# Patient Record
Sex: MALE | Race: WHITE | Employment: FULL TIME | URBAN - NONMETROPOLITAN AREA
[De-identification: names, ages, dates, MRNs, and addresses within clinical notes are randomized per-mention and may not be internally consistent; named-entity substitution may affect disease eponyms.]

---

## 2017-03-01 ENCOUNTER — OFFICE VISIT (OUTPATIENT)
Dept: FAMILY MEDICINE CLINIC | Age: 37
End: 2017-03-01

## 2017-03-01 VITALS
HEIGHT: 74 IN | RESPIRATION RATE: 12 BRPM | BODY MASS INDEX: 27.75 KG/M2 | SYSTOLIC BLOOD PRESSURE: 114 MMHG | DIASTOLIC BLOOD PRESSURE: 80 MMHG | WEIGHT: 216.2 LBS | HEART RATE: 60 BPM

## 2017-03-01 DIAGNOSIS — I34.1 MILD MITRAL VALVE PROLAPSE: ICD-10-CM

## 2017-03-01 DIAGNOSIS — Z00.00 ENCOUNTER FOR PREVENTATIVE ADULT HEALTH CARE EXAMINATION: Primary | ICD-10-CM

## 2017-03-01 DIAGNOSIS — R55 VASODEPRESSOR SYNCOPE: ICD-10-CM

## 2017-03-01 PROCEDURE — 99395 PREV VISIT EST AGE 18-39: CPT | Performed by: FAMILY MEDICINE

## 2017-03-01 ASSESSMENT — ENCOUNTER SYMPTOMS
GASTROINTESTINAL NEGATIVE: 1
ALLERGIC/IMMUNOLOGIC NEGATIVE: 1
RESPIRATORY NEGATIVE: 1
EYES NEGATIVE: 1

## 2017-03-13 RX ORDER — CITALOPRAM 10 MG/1
10 TABLET ORAL DAILY
Qty: 30 TABLET | Refills: 11 | Status: SHIPPED | OUTPATIENT
Start: 2017-03-13 | End: 2018-03-21 | Stop reason: SDUPTHER

## 2017-03-23 ENCOUNTER — OFFICE VISIT (OUTPATIENT)
Dept: CARDIOLOGY | Age: 37
End: 2017-03-23
Payer: COMMERCIAL

## 2017-03-23 VITALS
BODY MASS INDEX: 28.11 KG/M2 | DIASTOLIC BLOOD PRESSURE: 70 MMHG | HEART RATE: 59 BPM | SYSTOLIC BLOOD PRESSURE: 114 MMHG | HEIGHT: 74 IN | WEIGHT: 219 LBS

## 2017-03-23 DIAGNOSIS — I34.1 MVP (MITRAL VALVE PROLAPSE): Primary | ICD-10-CM

## 2017-03-23 DIAGNOSIS — R55 VASODEPRESSOR SYNCOPE: ICD-10-CM

## 2017-03-23 PROCEDURE — 99213 OFFICE O/P EST LOW 20 MIN: CPT | Performed by: INTERNAL MEDICINE

## 2017-03-23 PROCEDURE — 93000 ELECTROCARDIOGRAM COMPLETE: CPT | Performed by: INTERNAL MEDICINE

## 2017-03-23 ASSESSMENT — ENCOUNTER SYMPTOMS
CHOKING: 0
ABDOMINAL DISTENTION: 0
WHEEZING: 0
COUGH: 0
SHORTNESS OF BREATH: 0
CHEST TIGHTNESS: 0

## 2017-03-27 ENCOUNTER — TELEPHONE (OUTPATIENT)
Dept: FAMILY MEDICINE CLINIC | Age: 37
End: 2017-03-27

## 2017-03-27 DIAGNOSIS — E78.5 HYPERLIPIDEMIA, UNSPECIFIED HYPERLIPIDEMIA TYPE: Primary | ICD-10-CM

## 2017-03-27 RX ORDER — ATORVASTATIN CALCIUM 10 MG/1
10 TABLET, FILM COATED ORAL DAILY
Qty: 30 TABLET | Refills: 3 | Status: SHIPPED | OUTPATIENT
Start: 2017-03-27 | End: 2017-08-04 | Stop reason: SDUPTHER

## 2017-08-04 RX ORDER — ATORVASTATIN CALCIUM 10 MG/1
TABLET, FILM COATED ORAL
Qty: 30 TABLET | Refills: 6 | Status: SHIPPED | OUTPATIENT
Start: 2017-08-04 | End: 2018-03-21 | Stop reason: SDUPTHER

## 2017-08-07 ENCOUNTER — OFFICE VISIT (OUTPATIENT)
Dept: PRIMARY CARE CLINIC | Age: 37
End: 2017-08-07
Payer: COMMERCIAL

## 2017-08-07 VITALS
BODY MASS INDEX: 27.46 KG/M2 | SYSTOLIC BLOOD PRESSURE: 120 MMHG | OXYGEN SATURATION: 98 % | TEMPERATURE: 98.9 F | HEIGHT: 74 IN | HEART RATE: 72 BPM | WEIGHT: 214 LBS | DIASTOLIC BLOOD PRESSURE: 80 MMHG

## 2017-08-07 DIAGNOSIS — J40 BRONCHITIS: Primary | ICD-10-CM

## 2017-08-07 PROCEDURE — 99213 OFFICE O/P EST LOW 20 MIN: CPT | Performed by: PHYSICIAN ASSISTANT

## 2017-08-07 RX ORDER — PREDNISONE 20 MG/1
20 TABLET ORAL 2 TIMES DAILY
Qty: 10 TABLET | Refills: 0 | Status: SHIPPED | OUTPATIENT
Start: 2017-08-07 | End: 2017-08-12

## 2017-08-07 RX ORDER — DEXTROMETHORPHAN HYDROBROMIDE AND PROMETHAZINE HYDROCHLORIDE 15; 6.25 MG/5ML; MG/5ML
5 SYRUP ORAL 4 TIMES DAILY PRN
Qty: 120 ML | Refills: 0 | Status: SHIPPED | OUTPATIENT
Start: 2017-08-07 | End: 2017-08-14

## 2017-08-07 RX ORDER — AZITHROMYCIN 250 MG/1
TABLET, FILM COATED ORAL
Qty: 1 PACKET | Refills: 0 | Status: SHIPPED | OUTPATIENT
Start: 2017-08-07 | End: 2017-08-17

## 2017-08-07 ASSESSMENT — ENCOUNTER SYMPTOMS
SHORTNESS OF BREATH: 0
WHEEZING: 0
SORE THROAT: 1
COUGH: 1

## 2018-03-08 ENCOUNTER — OFFICE VISIT (OUTPATIENT)
Dept: FAMILY MEDICINE CLINIC | Age: 38
End: 2018-03-08
Payer: COMMERCIAL

## 2018-03-08 VITALS
SYSTOLIC BLOOD PRESSURE: 118 MMHG | HEIGHT: 74 IN | DIASTOLIC BLOOD PRESSURE: 68 MMHG | BODY MASS INDEX: 28 KG/M2 | WEIGHT: 218.2 LBS | HEART RATE: 63 BPM | RESPIRATION RATE: 18 BRPM

## 2018-03-08 DIAGNOSIS — R55 VASODEPRESSOR SYNCOPE: ICD-10-CM

## 2018-03-08 DIAGNOSIS — E78.5 HYPERLIPIDEMIA, UNSPECIFIED HYPERLIPIDEMIA TYPE: ICD-10-CM

## 2018-03-08 DIAGNOSIS — Z11.4 SCREENING FOR HIV (HUMAN IMMUNODEFICIENCY VIRUS): Primary | ICD-10-CM

## 2018-03-08 DIAGNOSIS — Z00.00 ENCOUNTER FOR PREVENTATIVE ADULT HEALTH CARE EXAMINATION: ICD-10-CM

## 2018-03-08 PROCEDURE — 99395 PREV VISIT EST AGE 18-39: CPT | Performed by: FAMILY MEDICINE

## 2018-03-08 ASSESSMENT — PATIENT HEALTH QUESTIONNAIRE - PHQ9
2. FEELING DOWN, DEPRESSED OR HOPELESS: 0
SUM OF ALL RESPONSES TO PHQ9 QUESTIONS 1 & 2: 0
SUM OF ALL RESPONSES TO PHQ QUESTIONS 1-9: 0
1. LITTLE INTEREST OR PLEASURE IN DOING THINGS: 0

## 2018-03-08 ASSESSMENT — ENCOUNTER SYMPTOMS
ALLERGIC/IMMUNOLOGIC NEGATIVE: 1
EYES NEGATIVE: 1
GASTROINTESTINAL NEGATIVE: 1
RESPIRATORY NEGATIVE: 1

## 2018-03-08 NOTE — PROGRESS NOTES
Visit Information    Have you changed or started any medications since your last visit including any over-the-counter medicines, vitamins, or herbal medicines? no   Are you having any side effects from any of your medications? -  no  Have you stopped taking any of your medications? Is so, why? -  no    Have you seen any other physician or provider since your last visit? No  Have you had any other diagnostic tests since your last visit? No  Have you been seen in the emergency room and/or had an admission to a hospital since we last saw you? No  Have you had your routine dental cleaning in the past 6 months? no    Have you activated your [employer] account? If not, what are your barriers?  No     Patient Care Team:  Florina Al MD as PCP - General (Family Medicine)    Medical History Review  Past Medical, Family, and Social History reviewed and does contribute to the patient presenting condition    Health Maintenance   Topic Date Due    HIV screen  11/21/1995    Flu vaccine (1) 09/01/2017    DTaP/Tdap/Td vaccine (8 - Td) 01/12/2020
heel pain ongoing. Skin: Negative. Allergic/Immunologic: Negative. Neurological: Negative. Hematological: Negative. Psychiatric/Behavioral: Negative. Objective:   Physical Exam   Constitutional: He is oriented to person, place, and time. He appears well-developed and well-nourished. No distress. HENT:   Head: Normocephalic and atraumatic. Right Ear: External ear normal.   Left Ear: External ear normal.   Mouth/Throat: Oropharynx is clear and moist. No oropharyngeal exudate. Eyes: Conjunctivae and EOM are normal. No scleral icterus. Neck: Neck supple. No thyromegaly present. Cardiovascular: Normal rate, regular rhythm, normal heart sounds and intact distal pulses. No murmur heard. Pulmonary/Chest: Effort normal and breath sounds normal. No respiratory distress. He has no wheezes. Abdominal: Soft. Bowel sounds are normal. He exhibits no distension. There is no tenderness. There is no rebound. Musculoskeletal: Normal range of motion. He exhibits no edema or tenderness. Neurological: He is alert and oriented to person, place, and time. Skin: Skin is warm and dry. No rash noted. No erythema. Psychiatric: He has a normal mood and affect. His behavior is normal. Judgment and thought content normal.     /68   Pulse 63   Resp 18   Ht 6' 2.02\" (1.88 m)   Wt 218 lb 3.2 oz (99 kg)   BMI 28.00 kg/m²     Assessment:       Annual Updated health maintenance annual exam  Healthy male for age  Intermittent plantar fasciitis in heels. History of MVP. Quiescent long term. History of vasomotor syncope. Remote, no recurrent symptoms on citalopram.  Cont. Same. Hyperlipidemia; updated labs pending draw on lipitor. Plan:          Hyperlipidemia; follow up labs pending. Plan to cont. Current dosing and 6 month follow up. Syncope; quiescent over the last year. Plan to cont. celexa for now. Plantar fasciitis.   Discussed heel cup trial.  Can inject if persistent

## 2018-03-12 ENCOUNTER — HOSPITAL ENCOUNTER (OUTPATIENT)
Dept: LAB | Age: 38
Setting detail: SPECIMEN
Discharge: HOME | End: 2018-03-12
Payer: COMMERCIAL

## 2018-03-12 DIAGNOSIS — Z11.4 SCREENING FOR HIV (HUMAN IMMUNODEFICIENCY VIRUS): ICD-10-CM

## 2018-03-12 DIAGNOSIS — E78.5 HYPERLIPIDEMIA, UNSPECIFIED HYPERLIPIDEMIA TYPE: ICD-10-CM

## 2018-03-12 LAB
ALT SERPL-CCNC: 37 U/L (ref 5–41)
CHOLESTEROL/HDL RATIO: 4.5
CHOLESTEROL: 168 MG/DL
HDLC SERPL-MCNC: 37 MG/DL
HIV AG/AB: NONREACTIVE
LDL CHOLESTEROL: 98 MG/DL (ref 0–130)
TRIGL SERPL-MCNC: 167 MG/DL
VLDLC SERPL CALC-MCNC: ABNORMAL MG/DL (ref 1–30)

## 2018-03-12 PROCEDURE — 80061 LIPID PANEL: CPT

## 2018-03-12 PROCEDURE — 36415 COLL VENOUS BLD VENIPUNCTURE: CPT

## 2018-03-12 PROCEDURE — 84460 ALANINE AMINO (ALT) (SGPT): CPT

## 2018-03-12 PROCEDURE — 87389 HIV-1 AG W/HIV-1&-2 AB AG IA: CPT

## 2018-03-15 ENCOUNTER — OFFICE VISIT (OUTPATIENT)
Dept: CARDIOLOGY | Age: 38
End: 2018-03-15
Payer: COMMERCIAL

## 2018-03-15 VITALS
BODY MASS INDEX: 27.59 KG/M2 | HEART RATE: 58 BPM | HEIGHT: 74 IN | SYSTOLIC BLOOD PRESSURE: 120 MMHG | DIASTOLIC BLOOD PRESSURE: 70 MMHG | WEIGHT: 215 LBS

## 2018-03-15 DIAGNOSIS — I34.1 MVP (MITRAL VALVE PROLAPSE): Primary | ICD-10-CM

## 2018-03-15 PROCEDURE — 93000 ELECTROCARDIOGRAM COMPLETE: CPT | Performed by: INTERNAL MEDICINE

## 2018-03-15 PROCEDURE — 99212 OFFICE O/P EST SF 10 MIN: CPT | Performed by: INTERNAL MEDICINE

## 2018-03-15 NOTE — PROGRESS NOTES
No  Skin: Warm and dry    Cardiac data:    EKG: Sinus  Bradycardia   WITHIN NORMAL LIMITS    Labs:     CBC: No results for input(s): WBC, HGB, HCT, PLT in the last 72 hours. BMP: No results for input(s): NA, K, CO2, BUN, CREATININE, LABGLOM, GLUCOSE in the last 72 hours. PT/INR: No results for input(s): PROTIME, INR in the last 72 hours.   FASTING LIPID PANEL:  Lab Results   Component Value Date    HDL 37 03/12/2018    TRIG 167 03/12/2018     LIVER PROFILE:  Recent Labs      03/12/18   1057   ALT  37       IMPRESSION:    Vasodepressor syncope, +tilt table test, asymptomatic on SSRI  Asymptomatic Bradycardia   HTN, contorlled  Patient Active Problem List   Diagnosis    Vasodepressor syncope    MVP (mitral valve prolapse)    Mild mitral valve prolapse       RECOMMENDATIONS:  REGULAR EXERCISE  CONTINUE CURRENT TREATMENT    F/U 12  MONTHS          [facility]           [phone]

## 2018-03-22 RX ORDER — ATORVASTATIN CALCIUM 10 MG/1
TABLET, FILM COATED ORAL
Qty: 90 TABLET | Refills: 1 | Status: SHIPPED | OUTPATIENT
Start: 2018-03-22 | End: 2018-09-10 | Stop reason: SDUPTHER

## 2018-03-22 RX ORDER — CITALOPRAM 10 MG/1
TABLET ORAL
Qty: 90 TABLET | Refills: 1 | Status: SHIPPED | OUTPATIENT
Start: 2018-03-22 | End: 2018-09-10 | Stop reason: SDUPTHER

## 2018-09-10 ENCOUNTER — OFFICE VISIT (OUTPATIENT)
Dept: FAMILY MEDICINE CLINIC | Age: 38
End: 2018-09-10
Payer: COMMERCIAL

## 2018-09-10 VITALS
RESPIRATION RATE: 12 BRPM | DIASTOLIC BLOOD PRESSURE: 76 MMHG | BODY MASS INDEX: 28.75 KG/M2 | HEIGHT: 74 IN | HEART RATE: 56 BPM | WEIGHT: 224 LBS | SYSTOLIC BLOOD PRESSURE: 112 MMHG

## 2018-09-10 DIAGNOSIS — E78.00 PURE HYPERCHOLESTEROLEMIA: ICD-10-CM

## 2018-09-10 DIAGNOSIS — I34.1 MILD MITRAL VALVE PROLAPSE: ICD-10-CM

## 2018-09-10 DIAGNOSIS — R55 VASODEPRESSOR SYNCOPE: ICD-10-CM

## 2018-09-10 DIAGNOSIS — Z00.00 ENCOUNTER FOR PREVENTATIVE ADULT HEALTH CARE EXAMINATION: Primary | ICD-10-CM

## 2018-09-10 PROCEDURE — 99395 PREV VISIT EST AGE 18-39: CPT | Performed by: FAMILY MEDICINE

## 2018-09-10 RX ORDER — ATORVASTATIN CALCIUM 10 MG/1
10 TABLET, FILM COATED ORAL DAILY
Qty: 90 TABLET | Refills: 3 | Status: SHIPPED | OUTPATIENT
Start: 2018-09-10 | End: 2019-09-28 | Stop reason: SDUPTHER

## 2018-09-10 RX ORDER — CITALOPRAM 10 MG/1
10 TABLET ORAL DAILY
Qty: 90 TABLET | Refills: 3 | Status: SHIPPED | OUTPATIENT
Start: 2018-09-10 | End: 2019-09-28 | Stop reason: SDUPTHER

## 2018-09-10 ASSESSMENT — PATIENT HEALTH QUESTIONNAIRE - PHQ9
1. LITTLE INTEREST OR PLEASURE IN DOING THINGS: 0
SUM OF ALL RESPONSES TO PHQ QUESTIONS 1-9: 0
SUM OF ALL RESPONSES TO PHQ QUESTIONS 1-9: 0
SUM OF ALL RESPONSES TO PHQ9 QUESTIONS 1 & 2: 0
2. FEELING DOWN, DEPRESSED OR HOPELESS: 0

## 2018-09-10 ASSESSMENT — ENCOUNTER SYMPTOMS
RESPIRATORY NEGATIVE: 1
ALLERGIC/IMMUNOLOGIC NEGATIVE: 1
EYES NEGATIVE: 1
GASTROINTESTINAL NEGATIVE: 1

## 2018-09-10 NOTE — PROGRESS NOTES
celexa for now. Plantar fasciitis. Discussed heel cup trial.  Can inject if persistent or progressive. Ongoing but improved at present. Will review labs as they become available.

## 2018-09-10 NOTE — PATIENT INSTRUCTIONS
High cholesterol raises your risk of a heart attack and stroke. There are different types of cholesterol. LDL is the \"bad\" cholesterol. High LDL can raise your risk for heart disease, heart attack, and stroke. HDL is the \"good\" cholesterol. High HDL is linked with a lower risk for heart disease, heart attack, and stroke. Your cholesterol levels help your doctor find out your risk for having a heart attack or stroke. How can you prevent high cholesterol? A heart-healthy lifestyle can help you prevent high cholesterol. This lifestyle helps lower your risk for a heart attack and stroke. · Eat heart-healthy foods. ¨ Eat fruits, vegetables, whole grains (like oatmeal), dried beans and peas, nuts and seeds, soy products (like tofu), and fat-free or low-fat dairy products. ¨ Replace butter, margarine, and hydrogenated or partially hydrogenated oils with olive and canola oils. (Canola oil margarine without trans fat is fine.)  ¨ Replace red meat with fish, poultry, and soy protein (like tofu). ¨ Limit processed and packaged foods like chips, crackers, and cookies. · Be active. Exercise can improve your cholesterol level. Get at least 30 minutes of exercise on most days of the week. Walking is a good choice. You also may want to do other activities, such as running, swimming, cycling, or playing tennis or team sports. · Stay at a healthy weight. Lose weight if you need to. · Don't smoke. If you need help quitting, talk to your doctor about stop-smoking programs and medicines. These can increase your chances of quitting for good. How is high cholesterol treated? The goal of treatment is to reduce your chances of having a heart attack or stroke. The goal is not to lower your cholesterol numbers only. · You may make lifestyle changes, such as eating healthy foods, not smoking, losing weight, and being more active. · You may have to take medicine. Follow-up care is a key part of your treatment and safety.  Be sure

## 2019-03-01 ENCOUNTER — HOSPITAL ENCOUNTER (OUTPATIENT)
Dept: LAB | Age: 39
Discharge: HOME | End: 2019-03-01
Payer: COMMERCIAL

## 2019-03-01 DIAGNOSIS — Z00.00 ENCOUNTER FOR PREVENTATIVE ADULT HEALTH CARE EXAMINATION: ICD-10-CM

## 2019-03-01 DIAGNOSIS — E78.00 PURE HYPERCHOLESTEROLEMIA: ICD-10-CM

## 2019-03-01 LAB
ALBUMIN SERPL-MCNC: 5 G/DL (ref 3.5–5.2)
ALBUMIN/GLOBULIN RATIO: 1.9 (ref 1–2.5)
ALP BLD-CCNC: 66 U/L (ref 40–129)
ALT SERPL-CCNC: 32 U/L (ref 5–41)
ANION GAP SERPL CALCULATED.3IONS-SCNC: 13 MMOL/L (ref 9–17)
AST SERPL-CCNC: 28 U/L
BILIRUB SERPL-MCNC: 0.87 MG/DL (ref 0.3–1.2)
BUN BLDV-MCNC: 11 MG/DL (ref 6–20)
BUN/CREAT BLD: 13 (ref 9–20)
CALCIUM SERPL-MCNC: 10.2 MG/DL (ref 8.6–10.4)
CHLORIDE BLD-SCNC: 101 MMOL/L (ref 98–107)
CHOLESTEROL/HDL RATIO: 4.6
CHOLESTEROL: 156 MG/DL
CO2: 28 MMOL/L (ref 20–31)
CREAT SERPL-MCNC: 0.82 MG/DL (ref 0.7–1.2)
GFR AFRICAN AMERICAN: >60 ML/MIN
GFR NON-AFRICAN AMERICAN: >60 ML/MIN
GFR SERPL CREATININE-BSD FRML MDRD: ABNORMAL ML/MIN/{1.73_M2}
GFR SERPL CREATININE-BSD FRML MDRD: ABNORMAL ML/MIN/{1.73_M2}
GLUCOSE BLD-MCNC: 104 MG/DL (ref 70–99)
HDLC SERPL-MCNC: 34 MG/DL
LDL CHOLESTEROL: 90 MG/DL (ref 0–130)
POTASSIUM SERPL-SCNC: 4.1 MMOL/L (ref 3.7–5.3)
SODIUM BLD-SCNC: 142 MMOL/L (ref 135–144)
TOTAL CK: 208 U/L (ref 39–308)
TOTAL PROTEIN: 7.6 G/DL (ref 6.4–8.3)
TRIGL SERPL-MCNC: 160 MG/DL
VLDLC SERPL CALC-MCNC: ABNORMAL MG/DL (ref 1–30)

## 2019-03-01 PROCEDURE — 80053 COMPREHEN METABOLIC PANEL: CPT

## 2019-03-01 PROCEDURE — 80061 LIPID PANEL: CPT

## 2019-03-01 PROCEDURE — 36415 COLL VENOUS BLD VENIPUNCTURE: CPT

## 2019-03-01 PROCEDURE — 82550 ASSAY OF CK (CPK): CPT

## 2019-03-14 ENCOUNTER — OFFICE VISIT (OUTPATIENT)
Dept: CARDIOLOGY | Age: 39
End: 2019-03-14
Payer: COMMERCIAL

## 2019-03-14 VITALS
HEIGHT: 74 IN | SYSTOLIC BLOOD PRESSURE: 110 MMHG | BODY MASS INDEX: 29.34 KG/M2 | HEART RATE: 58 BPM | WEIGHT: 228.6 LBS | DIASTOLIC BLOOD PRESSURE: 70 MMHG

## 2019-03-14 DIAGNOSIS — I34.1 MVP (MITRAL VALVE PROLAPSE): Primary | ICD-10-CM

## 2019-03-14 DIAGNOSIS — R55 VASODEPRESSOR SYNCOPE: ICD-10-CM

## 2019-03-14 PROCEDURE — 99212 OFFICE O/P EST SF 10 MIN: CPT | Performed by: INTERNAL MEDICINE

## 2019-03-14 PROCEDURE — 93000 ELECTROCARDIOGRAM COMPLETE: CPT | Performed by: INTERNAL MEDICINE

## 2019-03-15 ENCOUNTER — OFFICE VISIT (OUTPATIENT)
Dept: FAMILY MEDICINE CLINIC | Age: 39
End: 2019-03-15
Payer: COMMERCIAL

## 2019-03-15 VITALS
WEIGHT: 227 LBS | SYSTOLIC BLOOD PRESSURE: 110 MMHG | BODY MASS INDEX: 29.13 KG/M2 | HEART RATE: 68 BPM | HEIGHT: 74 IN | DIASTOLIC BLOOD PRESSURE: 60 MMHG

## 2019-03-15 DIAGNOSIS — M72.2 PLANTAR FASCIITIS, BILATERAL: ICD-10-CM

## 2019-03-15 DIAGNOSIS — R55 VASODEPRESSOR SYNCOPE: ICD-10-CM

## 2019-03-15 DIAGNOSIS — E78.00 PURE HYPERCHOLESTEROLEMIA: ICD-10-CM

## 2019-03-15 PROCEDURE — 99214 OFFICE O/P EST MOD 30 MIN: CPT | Performed by: FAMILY MEDICINE

## 2019-03-15 ASSESSMENT — ENCOUNTER SYMPTOMS
RESPIRATORY NEGATIVE: 1
EYES NEGATIVE: 1
ALLERGIC/IMMUNOLOGIC NEGATIVE: 1
GASTROINTESTINAL NEGATIVE: 1

## 2019-03-15 ASSESSMENT — PATIENT HEALTH QUESTIONNAIRE - PHQ9
SUM OF ALL RESPONSES TO PHQ9 QUESTIONS 1 & 2: 0
SUM OF ALL RESPONSES TO PHQ QUESTIONS 1-9: 0
SUM OF ALL RESPONSES TO PHQ QUESTIONS 1-9: 0
2. FEELING DOWN, DEPRESSED OR HOPELESS: 0
1. LITTLE INTEREST OR PLEASURE IN DOING THINGS: 0

## 2019-09-30 RX ORDER — ATORVASTATIN CALCIUM 10 MG/1
TABLET, FILM COATED ORAL
Qty: 30 TABLET | Refills: 11 | Status: SHIPPED | OUTPATIENT
Start: 2019-09-30 | End: 2020-09-15 | Stop reason: SDUPTHER

## 2019-09-30 RX ORDER — CITALOPRAM 10 MG/1
TABLET ORAL
Qty: 30 TABLET | Refills: 11 | Status: SHIPPED | OUTPATIENT
Start: 2019-09-30 | End: 2020-09-15 | Stop reason: SDUPTHER

## 2020-03-16 ENCOUNTER — OFFICE VISIT (OUTPATIENT)
Dept: FAMILY MEDICINE CLINIC | Age: 40
End: 2020-03-16
Payer: COMMERCIAL

## 2020-03-16 VITALS
SYSTOLIC BLOOD PRESSURE: 130 MMHG | HEART RATE: 72 BPM | WEIGHT: 226.3 LBS | BODY MASS INDEX: 29.04 KG/M2 | RESPIRATION RATE: 16 BRPM | OXYGEN SATURATION: 98 % | DIASTOLIC BLOOD PRESSURE: 82 MMHG | HEIGHT: 74 IN | TEMPERATURE: 98.1 F

## 2020-03-16 PROCEDURE — 99214 OFFICE O/P EST MOD 30 MIN: CPT | Performed by: FAMILY MEDICINE

## 2020-03-16 ASSESSMENT — ENCOUNTER SYMPTOMS
GASTROINTESTINAL NEGATIVE: 1
RESPIRATORY NEGATIVE: 1
EYES NEGATIVE: 1
ALLERGIC/IMMUNOLOGIC NEGATIVE: 1

## 2020-03-16 ASSESSMENT — PATIENT HEALTH QUESTIONNAIRE - PHQ9
DEPRESSION UNABLE TO ASSESS: PT REFUSES
SUM OF ALL RESPONSES TO PHQ QUESTIONS 1-9: 0
SUM OF ALL RESPONSES TO PHQ9 QUESTIONS 1 & 2: 0
2. FEELING DOWN, DEPRESSED OR HOPELESS: 0
1. LITTLE INTEREST OR PLEASURE IN DOING THINGS: 0
SUM OF ALL RESPONSES TO PHQ QUESTIONS 1-9: 0

## 2020-03-16 NOTE — PROGRESS NOTES
Subjective:      Patient ID: Rick Chun is a 44 y.o. male. Hyperlipidemia        Routine annual preventative exam follow up on chronic medical conditions, refills, and review of updated labs. I have reviewed the patient's medical history in detail and updated the computerized patient record. Feeling well at present. No interval or illness to report. 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 Heel pain still off and on but much better then in the past, manageable. He does restrict some activities like running that aggravate the issue. Tolerating statin. Compliant with meds without concerns for side effects. Past Medical History:   Diagnosis Date    Erectile dysfunction     Hyperlipidemia     Mild mitral valve prolapse     with mild regurgitation, asymptomatic.  Pectus excavatum     Prediabetes     Vasodepressor syncope     since 1999. History reviewed. No pertinent surgical history. Current Outpatient Medications   Medication Sig Dispense Refill    atorvastatin (LIPITOR) 10 MG tablet TAKE 1 TABLET BY MOUTH ONCE DAILY 30 tablet 11    citalopram (CELEXA) 10 MG tablet TAKE 1 TABLET BY MOUTH ONCE DAILY 30 tablet 11     No current facility-administered medications for this visit. No Known Allergies  Social History     Tobacco Use    Smoking status: Never Smoker    Smokeless tobacco: Never Used   Substance Use Topics    Alcohol use:  Yes     Alcohol/week: 0.0 standard drinks     Comment: occasional, less than 1 drink per day    Drug use: No     Family History   Problem Relation Age of Onset    Cancer Maternal Grandmother         bone    Colon Cancer Maternal Grandfather     Heart Attack Maternal Grandfather     Colon Cancer Paternal Grandmother     Heart Disease Paternal Grandfather     Heart Attack Father         2 or 3    Cancer Father         Waldenstrom's macroglobulinemia    Heart Disease Father         unknown type, 35s lipitor    Syncope; quiescent over the last year. Plan to cont. celexa for now. Plantar fasciitis. Discussed heel cup trial.  Can inject if persistent or progressive. Ongoing but improved at present. More activity related exacerbations at present. Tolerable. Minimal ifbs this check. Will follow trend. Stable /low at present.

## 2020-08-13 ENCOUNTER — OFFICE VISIT (OUTPATIENT)
Dept: CARDIOLOGY | Age: 40
End: 2020-08-13
Payer: COMMERCIAL

## 2020-08-13 VITALS
HEART RATE: 57 BPM | SYSTOLIC BLOOD PRESSURE: 114 MMHG | BODY MASS INDEX: 29.65 KG/M2 | HEIGHT: 74 IN | DIASTOLIC BLOOD PRESSURE: 80 MMHG | WEIGHT: 231 LBS

## 2020-08-13 PROCEDURE — 93000 ELECTROCARDIOGRAM COMPLETE: CPT | Performed by: INTERNAL MEDICINE

## 2020-08-13 PROCEDURE — 99214 OFFICE O/P EST MOD 30 MIN: CPT | Performed by: INTERNAL MEDICINE

## 2020-08-13 NOTE — PROGRESS NOTES
hematuria. · Musculoskeletal:  No gait disturbance, No weakness or joint complaints. · Integumentary: No rash or pruritis. · Neurological: No headache or diplopia. No tingling  · Psychiatric: No anxiety, or depression. · Endocrine: No temperature intolerance. · Hematologic/Lymphatic: No abnormal bruising or bleeding, blood clots or swollen lymph nodes. · Allergic/Immunologic: No nasal congestion or hives. PHYSICAL EXAM:      /80   Pulse 57   Ht 6' 2\" (1.88 m)   Wt 231 lb (104.8 kg)   BMI 29.66 kg/m²    Constitutional and General Appearance: alert, cooperative, no distress and appears stated age  [de-identified]: PERRL, no cervical lymphadenopathy. No masses palpable. Normal oral mucosa  Respiratory:  · Normal excursion and expansion without use of accessory muscles  · Resp Auscultation: Good respiratory effort. No for increased work of breathing. On auscultation: clear to auscultation bilaterally  Cardiovascular:  · Heart tones are crisp and normal. regular S1 and S2.  · Jugular venous pulsation Normal  · The carotid upstroke is normal in amplitude and contour without delay or bruit   Abdomen:   · soft  · Bowel sounds present  Extremities:  ·  No edema  Neurological:  · Alert and oriented. Cardiac Data:  EKG: sinus bradycardia    Labs:     CBC: No results for input(s): WBC, HGB, HCT, PLT in the last 72 hours. BMP: No results for input(s): NA, K, CO2, BUN, CREATININE, LABGLOM, GLUCOSE in the last 72 hours. PT/INR: No results for input(s): PROTIME, INR in the last 72 hours. FASTING LIPID PANEL:  Lab Results   Component Value Date    HDL 34 03/01/2019    TRIG 160 03/01/2019     LIVER PROFILE:No results for input(s): AST, ALT, LABALBU in the last 72 hours. Assessment and plan:    -Vasodepressor syncope, +tilt table test, asymptomatic on SSRI. Liberalize po fluid intake  -H/o asymptomatic bradycardia  -Overweight - encouraged diet, exercise, and discussed weight loss extensively.   -Hyperlipidemia- continue statin. LDL 90 on 3/01/2019  -RTC 12 months.     [facility]           [phone]

## 2020-09-04 ENCOUNTER — HOSPITAL ENCOUNTER (OUTPATIENT)
Dept: LAB | Age: 40
Discharge: HOME | End: 2020-09-04
Payer: COMMERCIAL

## 2020-09-04 LAB
ABSOLUTE EOS #: 0.07 K/UL (ref 0–0.44)
ABSOLUTE IMMATURE GRANULOCYTE: <0.03 K/UL (ref 0–0.3)
ABSOLUTE LYMPH #: 1.24 K/UL (ref 1.1–3.7)
ABSOLUTE MONO #: 0.37 K/UL (ref 0.1–1.2)
ALBUMIN SERPL-MCNC: 4.8 G/DL (ref 3.5–5.2)
ALBUMIN/GLOBULIN RATIO: 1.9 (ref 1–2.5)
ALP BLD-CCNC: 58 U/L (ref 40–129)
ALT SERPL-CCNC: 28 U/L (ref 5–41)
ANION GAP SERPL CALCULATED.3IONS-SCNC: 10 MMOL/L (ref 9–17)
AST SERPL-CCNC: 23 U/L
BASOPHILS # BLD: 0 % (ref 0–2)
BASOPHILS ABSOLUTE: <0.03 K/UL (ref 0–0.2)
BILIRUB SERPL-MCNC: 0.83 MG/DL (ref 0.3–1.2)
BUN BLDV-MCNC: 12 MG/DL (ref 6–20)
BUN/CREAT BLD: 13 (ref 9–20)
CALCIUM SERPL-MCNC: 10 MG/DL (ref 8.6–10.4)
CHLORIDE BLD-SCNC: 103 MMOL/L (ref 98–107)
CHOLESTEROL/HDL RATIO: 5.8
CHOLESTEROL: 169 MG/DL
CO2: 26 MMOL/L (ref 20–31)
CREAT SERPL-MCNC: 0.93 MG/DL (ref 0.7–1.2)
DIFFERENTIAL TYPE: NORMAL
EOSINOPHILS RELATIVE PERCENT: 1 % (ref 1–4)
ESTIMATED AVERAGE GLUCOSE: 103 MG/DL
GFR AFRICAN AMERICAN: >60 ML/MIN
GFR NON-AFRICAN AMERICAN: >60 ML/MIN
GFR SERPL CREATININE-BSD FRML MDRD: ABNORMAL ML/MIN/{1.73_M2}
GFR SERPL CREATININE-BSD FRML MDRD: ABNORMAL ML/MIN/{1.73_M2}
GLUCOSE BLD-MCNC: 108 MG/DL (ref 70–99)
HBA1C MFR BLD: 5.2 % (ref 4.8–5.9)
HCT VFR BLD CALC: 46.3 % (ref 40.7–50.3)
HDLC SERPL-MCNC: 29 MG/DL
HEMOGLOBIN: 15.4 G/DL (ref 13–17)
IMMATURE GRANULOCYTES: 0 %
LDL CHOLESTEROL: 103 MG/DL (ref 0–130)
LYMPHOCYTES # BLD: 26 % (ref 24–43)
MCH RBC QN AUTO: 31.1 PG (ref 25.2–33.5)
MCHC RBC AUTO-ENTMCNC: 33.3 G/DL (ref 25.2–33.5)
MCV RBC AUTO: 93.5 FL (ref 82.6–102.9)
MONOCYTES # BLD: 8 % (ref 3–12)
NRBC AUTOMATED: 0 PER 100 WBC
PDW BLD-RTO: 12.4 % (ref 11.8–14.4)
PLATELET # BLD: 182 K/UL (ref 138–453)
PLATELET ESTIMATE: NORMAL
PMV BLD AUTO: 9.4 FL (ref 8.1–13.5)
POTASSIUM SERPL-SCNC: 4 MMOL/L (ref 3.7–5.3)
RBC # BLD: 4.95 M/UL (ref 4.21–5.77)
RBC # BLD: NORMAL 10*6/UL
SEG NEUTROPHILS: 65 % (ref 36–65)
SEGMENTED NEUTROPHILS ABSOLUTE COUNT: 3.13 K/UL (ref 1.5–8.1)
SODIUM BLD-SCNC: 139 MMOL/L (ref 135–144)
TOTAL PROTEIN: 7.3 G/DL (ref 6.4–8.3)
TRIGL SERPL-MCNC: 184 MG/DL
VLDLC SERPL CALC-MCNC: ABNORMAL MG/DL (ref 1–30)
WBC # BLD: 4.8 K/UL (ref 3.5–11.3)
WBC # BLD: NORMAL 10*3/UL

## 2020-09-04 PROCEDURE — 83036 HEMOGLOBIN GLYCOSYLATED A1C: CPT

## 2020-09-04 PROCEDURE — 80053 COMPREHEN METABOLIC PANEL: CPT

## 2020-09-04 PROCEDURE — 80061 LIPID PANEL: CPT

## 2020-09-04 PROCEDURE — 85025 COMPLETE CBC W/AUTO DIFF WBC: CPT

## 2020-09-04 PROCEDURE — 36415 COLL VENOUS BLD VENIPUNCTURE: CPT

## 2020-09-15 ENCOUNTER — OFFICE VISIT (OUTPATIENT)
Dept: FAMILY MEDICINE CLINIC | Age: 40
End: 2020-09-15
Payer: COMMERCIAL

## 2020-09-15 VITALS
BODY MASS INDEX: 28.75 KG/M2 | WEIGHT: 224 LBS | TEMPERATURE: 97 F | HEIGHT: 74 IN | SYSTOLIC BLOOD PRESSURE: 108 MMHG | DIASTOLIC BLOOD PRESSURE: 64 MMHG | HEART RATE: 64 BPM

## 2020-09-15 PROCEDURE — 99213 OFFICE O/P EST LOW 20 MIN: CPT | Performed by: FAMILY MEDICINE

## 2020-09-15 RX ORDER — ATORVASTATIN CALCIUM 10 MG/1
TABLET, FILM COATED ORAL
Qty: 90 TABLET | Refills: 3 | Status: SHIPPED | OUTPATIENT
Start: 2020-09-15 | End: 2021-09-28 | Stop reason: SDUPTHER

## 2020-09-15 RX ORDER — CITALOPRAM 10 MG/1
TABLET ORAL
Qty: 90 TABLET | Refills: 3 | Status: SHIPPED | OUTPATIENT
Start: 2020-09-15 | End: 2021-09-28 | Stop reason: SDUPTHER

## 2020-09-15 ASSESSMENT — PATIENT HEALTH QUESTIONNAIRE - PHQ9
2. FEELING DOWN, DEPRESSED OR HOPELESS: 0
SUM OF ALL RESPONSES TO PHQ9 QUESTIONS 1 & 2: 0
SUM OF ALL RESPONSES TO PHQ QUESTIONS 1-9: 0
1. LITTLE INTEREST OR PLEASURE IN DOING THINGS: 0
SUM OF ALL RESPONSES TO PHQ QUESTIONS 1-9: 0

## 2020-09-15 ASSESSMENT — ENCOUNTER SYMPTOMS
EYES NEGATIVE: 1
RESPIRATORY NEGATIVE: 1
ALLERGIC/IMMUNOLOGIC NEGATIVE: 1
GASTROINTESTINAL NEGATIVE: 1

## 2020-09-15 NOTE — PATIENT INSTRUCTIONS
Chloride 09/04/2020 103  98 - 107 mmol/L Final    CO2 09/04/2020 26  20 - 31 mmol/L Final    Anion Gap 09/04/2020 10  9 - 17 mmol/L Final    Alkaline Phosphatase 09/04/2020 58  40 - 129 U/L Final    ALT 09/04/2020 28  5 - 41 U/L Final    AST 09/04/2020 23  <40 U/L Final    Total Bilirubin 09/04/2020 0.83  0.3 - 1.2 mg/dL Final    Total Protein 09/04/2020 7.3  6.4 - 8.3 g/dL Final    Alb 09/04/2020 4.8  3.5 - 5.2 g/dL Final    Albumin/Globulin Ratio 09/04/2020 1.9  1.0 - 2.5 Final    GFR Non- 09/04/2020 >60  >60 mL/min Final    GFR  09/04/2020 >60  >60 mL/min Final    GFR Comment 09/04/2020        Final    Comment: Average GFR for 30-36 years old:   80 mL/min/1.73sq m  Chronic Kidney Disease:   <60 mL/min/1.73sq m  Kidney failure:   <15 mL/min/1.73sq m              eGFR calculated using average adult body mass. Additional eGFR calculator available at:        [email or web address]            GFR Staging 09/04/2020 NOT REPORTED   Final    Cholesterol 09/04/2020 169  <200 mg/dL Final    Comment:    Cholesterol Guidelines:      <200  Desirable   200-240  Borderline      >240  Undesirable         HDL 09/04/2020 29* >40 mg/dL Final    Comment:    HDL Guidelines:    <40     Undesirable   40-59    Borderline    >59     Desirable         LDL Cholesterol 09/04/2020 103  0 - 130 mg/dL Final    Comment:    LDL Guidelines:     <100    Desirable   100-129   Near to/above Desirable   130-159   Borderline      >159   Undesirable     Direct (measured) LDL and calculated LDL are not interchangeable tests.  Chol/HDL Ratio 09/04/2020 5.8* <5 Final            Triglycerides 09/04/2020 184* <150 mg/dL Final    Comment:    Triglyceride Guidelines:     <150   Desirable   150-199  Borderline   200-499  High     >499   Very high   Based on AHA Guidelines for fasting triglyceride, October 2012.          VLDL 09/04/2020 NOT REPORTED* 1 - 30 mg/dL Final

## 2020-09-15 NOTE — PROGRESS NOTES
Subjective:      Patient ID: Harry Milner is a 44 y.o. male. Hyperlipidemia        Routine  follow up on chronic medical conditions, refills, and review of updated labs. I have reviewed the patient's medical history in detail and updated the computerized patient record. Feeling well at present. No interval or illness to report. 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 Heel pain still off and on but much better then in the past, manageable. He does restrict some activities like running that aggravate the issue. Tolerating statin. Compliant with meds without concerns for side effects. No dizziness or syncope concerns over the interval.    Past Medical History:   Diagnosis Date    Erectile dysfunction     Hyperlipidemia     Mild mitral valve prolapse     with mild regurgitation, asymptomatic.  Pectus excavatum     Prediabetes     Vasodepressor syncope     since 1999. History reviewed. No pertinent surgical history. Current Outpatient Medications   Medication Sig Dispense Refill    citalopram (CELEXA) 10 MG tablet TAKE 1 TABLET BY MOUTH ONCE DAILY 30 tablet 11    atorvastatin (LIPITOR) 10 MG tablet TAKE 1 TABLET BY MOUTH ONCE DAILY 30 tablet 11     No current facility-administered medications for this visit. No Known Allergies  Social History     Tobacco Use    Smoking status: Never Smoker    Smokeless tobacco: Never Used   Substance Use Topics    Alcohol use:  Yes     Alcohol/week: 0.0 standard drinks     Comment: occasional, less than 1 drink per day    Drug use: No     Family History   Problem Relation Age of Onset    Cancer Maternal Grandmother         bone    Colon Cancer Maternal Grandfather     Heart Attack Maternal Grandfather     Colon Cancer Paternal Grandmother     Heart Disease Paternal Grandfather     Heart Attack Father         2 or 3    Cancer Father         Waldenstrom's macroglobulinemia    Heart Disease Father         unknown type, 35s    Chronic Infections Maternal Cousin     Heart Disease Other     Hypertension Other          Review of Systems   Constitutional: Negative. HENT: Negative. Eyes: Negative. Respiratory: Negative. Cardiovascular: Negative. Gastrointestinal: Negative. Endocrine: Negative. Genitourinary: Negative. Musculoskeletal: Negative. Arthralgias: bilateral heel pain ongoing. Skin: Negative. Allergic/Immunologic: Negative. Neurological: Negative. Hematological: Negative. Psychiatric/Behavioral: Negative. Objective:   Physical Exam  Constitutional:       General: He is not in acute distress. Appearance: He is well-developed. HENT:      Head: Normocephalic and atraumatic. Right Ear: External ear normal.      Left Ear: External ear normal.      Mouth/Throat:      Pharynx: No oropharyngeal exudate. Eyes:      General: No scleral icterus. Conjunctiva/sclera: Conjunctivae normal.   Neck:      Musculoskeletal: Neck supple. Thyroid: No thyromegaly. Cardiovascular:      Rate and Rhythm: Normal rate and regular rhythm. Heart sounds: Normal heart sounds. No murmur. Pulmonary:      Effort: Pulmonary effort is normal. No respiratory distress. Breath sounds: Normal breath sounds. No wheezing. Abdominal:      General: Bowel sounds are normal. There is no distension. Palpations: Abdomen is soft. Tenderness: There is no abdominal tenderness. There is no rebound. Musculoskeletal: Normal range of motion. General: No tenderness. Skin:     General: Skin is warm and dry. Findings: No erythema or rash. Neurological:      Mental Status: He is alert and oriented to person, place, and time. Psychiatric:         Behavior: Behavior normal.         Thought Content:  Thought content normal.         Judgment: Judgment normal.       /64 (Site: Right Upper Arm, Position: Sitting, Cuff Size: Large Adult)  Sodium 09/04/2020 139  135 - 144 mmol/L Final    Potassium 09/04/2020 4.0  3.7 - 5.3 mmol/L Final    Chloride 09/04/2020 103  98 - 107 mmol/L Final    CO2 09/04/2020 26  20 - 31 mmol/L Final    Anion Gap 09/04/2020 10  9 - 17 mmol/L Final    Alkaline Phosphatase 09/04/2020 58  40 - 129 U/L Final    ALT 09/04/2020 28  5 - 41 U/L Final    AST 09/04/2020 23  <40 U/L Final    Total Bilirubin 09/04/2020 0.83  0.3 - 1.2 mg/dL Final    Total Protein 09/04/2020 7.3  6.4 - 8.3 g/dL Final    Alb 09/04/2020 4.8  3.5 - 5.2 g/dL Final    Albumin/Globulin Ratio 09/04/2020 1.9  1.0 - 2.5 Final    GFR Non- 09/04/2020 >60  >60 mL/min Final    GFR  09/04/2020 >60  >60 mL/min Final    GFR Comment 09/04/2020        Final    GFR Staging 09/04/2020 NOT REPORTED   Final    Cholesterol 09/04/2020 169  <200 mg/dL Final    HDL 09/04/2020 29* >40 mg/dL Final    LDL Cholesterol 09/04/2020 103  0 - 130 mg/dL Final    Chol/HDL Ratio 09/04/2020 5.8* <5 Final    Triglycerides 09/04/2020 184* <150 mg/dL Final    VLDL 09/04/2020 NOT REPORTED* 1 - 30 mg/dL Final       Assessment:       Encounter Diagnoses   Name Primary?  Pure hypercholesterolemia Yes    Vasodepressor syncope     Plantar fasciitis, bilateral     Impaired fasting blood sugar          Plan:          Hyperlipidemia; improved/stable. Cont. lipitor    Syncope; quiescent over the last year. Plan to cont. celexa for now. Plantar fasciitis. Discussed heel cup trial.  Can inject if persistent or progressive. Ongoing but improved at present. More activity related exacerbations at present. Tolerable. Minimal ifbs this check. Will follow trend. Stable /low at present.  [address] [facility]

## 2021-03-09 ENCOUNTER — HOSPITAL ENCOUNTER (OUTPATIENT)
Dept: LAB | Age: 41
Discharge: HOME | End: 2021-03-09
Payer: COMMERCIAL

## 2021-03-09 DIAGNOSIS — E78.00 PURE HYPERCHOLESTEROLEMIA: ICD-10-CM

## 2021-03-09 DIAGNOSIS — R73.01 IMPAIRED FASTING BLOOD SUGAR: ICD-10-CM

## 2021-03-09 LAB
ABSOLUTE EOS #: 0.06 K/UL (ref 0–0.44)
ABSOLUTE IMMATURE GRANULOCYTE: <0.03 K/UL (ref 0–0.3)
ABSOLUTE LYMPH #: 0.99 K/UL (ref 1.1–3.7)
ABSOLUTE MONO #: 0.31 K/UL (ref 0.1–1.2)
ALBUMIN SERPL-MCNC: 4.7 G/DL (ref 3.5–5.2)
ALBUMIN/GLOBULIN RATIO: 2 (ref 1–2.5)
ALP BLD-CCNC: 61 U/L (ref 40–129)
ALT SERPL-CCNC: 25 U/L (ref 5–41)
ANION GAP SERPL CALCULATED.3IONS-SCNC: 9 MMOL/L (ref 9–17)
AST SERPL-CCNC: 22 U/L
BASOPHILS # BLD: 0 % (ref 0–2)
BASOPHILS ABSOLUTE: <0.03 K/UL (ref 0–0.2)
BILIRUB SERPL-MCNC: 0.76 MG/DL (ref 0.3–1.2)
BUN BLDV-MCNC: 11 MG/DL (ref 6–20)
BUN/CREAT BLD: 12 (ref 9–20)
CALCIUM SERPL-MCNC: 9.9 MG/DL (ref 8.6–10.4)
CHLORIDE BLD-SCNC: 102 MMOL/L (ref 98–107)
CHOLESTEROL/HDL RATIO: 5.2
CHOLESTEROL: 162 MG/DL
CO2: 27 MMOL/L (ref 20–31)
CREAT SERPL-MCNC: 0.94 MG/DL (ref 0.7–1.2)
DIFFERENTIAL TYPE: ABNORMAL
EOSINOPHILS RELATIVE PERCENT: 2 % (ref 1–4)
GFR AFRICAN AMERICAN: >60 ML/MIN
GFR NON-AFRICAN AMERICAN: >60 ML/MIN
GFR SERPL CREATININE-BSD FRML MDRD: ABNORMAL ML/MIN/{1.73_M2}
GFR SERPL CREATININE-BSD FRML MDRD: ABNORMAL ML/MIN/{1.73_M2}
GLUCOSE BLD-MCNC: 100 MG/DL (ref 70–99)
HCT VFR BLD CALC: 46.6 % (ref 40.7–50.3)
HDLC SERPL-MCNC: 31 MG/DL
HEMOGLOBIN: 15.6 G/DL (ref 13–17)
IMMATURE GRANULOCYTES: 0 %
LDL CHOLESTEROL: 95 MG/DL (ref 0–130)
LYMPHOCYTES # BLD: 28 % (ref 24–43)
MCH RBC QN AUTO: 31 PG (ref 25.2–33.5)
MCHC RBC AUTO-ENTMCNC: 33.5 G/DL (ref 25.2–33.5)
MCV RBC AUTO: 92.6 FL (ref 82.6–102.9)
MONOCYTES # BLD: 9 % (ref 3–12)
NRBC AUTOMATED: 0 PER 100 WBC
PDW BLD-RTO: 12.6 % (ref 11.8–14.4)
PLATELET # BLD: 177 K/UL (ref 138–453)
PLATELET ESTIMATE: ABNORMAL
PMV BLD AUTO: 9.4 FL (ref 8.1–13.5)
POTASSIUM SERPL-SCNC: 4 MMOL/L (ref 3.7–5.3)
RBC # BLD: 5.03 M/UL (ref 4.21–5.77)
RBC # BLD: ABNORMAL 10*6/UL
SEG NEUTROPHILS: 61 % (ref 36–65)
SEGMENTED NEUTROPHILS ABSOLUTE COUNT: 2.22 K/UL (ref 1.5–8.1)
SODIUM BLD-SCNC: 138 MMOL/L (ref 135–144)
TOTAL PROTEIN: 7.1 G/DL (ref 6.4–8.3)
TRIGL SERPL-MCNC: 178 MG/DL
VLDLC SERPL CALC-MCNC: ABNORMAL MG/DL (ref 1–30)
WBC # BLD: 3.6 K/UL (ref 3.5–11.3)
WBC # BLD: ABNORMAL 10*3/UL

## 2021-03-09 PROCEDURE — 80053 COMPREHEN METABOLIC PANEL: CPT

## 2021-03-09 PROCEDURE — 80061 LIPID PANEL: CPT

## 2021-03-09 PROCEDURE — 36415 COLL VENOUS BLD VENIPUNCTURE: CPT

## 2021-03-09 PROCEDURE — 85025 COMPLETE CBC W/AUTO DIFF WBC: CPT

## 2021-03-16 ENCOUNTER — OFFICE VISIT (OUTPATIENT)
Dept: FAMILY MEDICINE CLINIC | Age: 41
End: 2021-03-16
Payer: COMMERCIAL

## 2021-03-16 VITALS
SYSTOLIC BLOOD PRESSURE: 110 MMHG | HEIGHT: 74 IN | DIASTOLIC BLOOD PRESSURE: 64 MMHG | WEIGHT: 224 LBS | BODY MASS INDEX: 28.75 KG/M2 | HEART RATE: 68 BPM

## 2021-03-16 DIAGNOSIS — R55 VASODEPRESSOR SYNCOPE: ICD-10-CM

## 2021-03-16 DIAGNOSIS — M72.2 PLANTAR FASCIITIS, BILATERAL: ICD-10-CM

## 2021-03-16 DIAGNOSIS — E78.00 PURE HYPERCHOLESTEROLEMIA: Primary | ICD-10-CM

## 2021-03-16 DIAGNOSIS — R73.01 IMPAIRED FASTING BLOOD SUGAR: ICD-10-CM

## 2021-03-16 PROCEDURE — 99214 OFFICE O/P EST MOD 30 MIN: CPT | Performed by: FAMILY MEDICINE

## 2021-03-16 ASSESSMENT — ENCOUNTER SYMPTOMS
GASTROINTESTINAL NEGATIVE: 1
ALLERGIC/IMMUNOLOGIC NEGATIVE: 1
EYES NEGATIVE: 1
RESPIRATORY NEGATIVE: 1

## 2021-03-16 ASSESSMENT — PATIENT HEALTH QUESTIONNAIRE - PHQ9
SUM OF ALL RESPONSES TO PHQ QUESTIONS 1-9: 0
2. FEELING DOWN, DEPRESSED OR HOPELESS: 0
SUM OF ALL RESPONSES TO PHQ9 QUESTIONS 1 & 2: 0
2. FEELING DOWN, DEPRESSED OR HOPELESS: 0
SUM OF ALL RESPONSES TO PHQ9 QUESTIONS 1 & 2: 0

## 2021-03-16 NOTE — PROGRESS NOTES
Subjective:      Patient ID: Pedro Cha is a 36 y.o. male. Hyperlipidemia       Routine  follow up on chronic medical conditions, refills, and review of updated labs. I have reviewed the patient's medical history in detail and updated the computerized patient record. Feeling well at present. No interval or illness to report. 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 Heel pain still off and on but much better then in the past, manageable. Discussed heel cups, especially with work boots. He does restrict some activities like running that aggravate the issue. Tolerating statin. Compliant with meds without concerns for side effects. No dizziness or syncope concerns over the interval.    Past Medical History:   Diagnosis Date    Erectile dysfunction     Hyperlipidemia     Mild mitral valve prolapse     with mild regurgitation, asymptomatic.  Pectus excavatum     Prediabetes     Vasodepressor syncope     since 1999. History reviewed. No pertinent surgical history. Current Outpatient Medications   Medication Sig Dispense Refill    atorvastatin (LIPITOR) 10 MG tablet TAKE 1 TABLET BY MOUTH ONCE DAILY 90 tablet 3    citalopram (CELEXA) 10 MG tablet TAKE 1 TABLET BY MOUTH ONCE DAILY 90 tablet 3     No current facility-administered medications for this visit. No Known Allergies  Social History     Tobacco Use    Smoking status: Never Smoker    Smokeless tobacco: Never Used   Substance Use Topics    Alcohol use:  Yes     Alcohol/week: 0.0 standard drinks     Comment: occasional, less than 1 drink per day    Drug use: No     Family History   Problem Relation Age of Onset    Cancer Maternal Grandmother         bone    Colon Cancer Maternal Grandfather     Heart Attack Maternal Grandfather     Colon Cancer Paternal Grandmother     Heart Disease Paternal Grandfather     Heart Attack Father         2 or 3    Cancer Father Waldenstrom's macroglobulinemia    Heart Disease Father         unknown type, 35s    Chronic Infections Maternal Cousin     Heart Disease Other     Hypertension Other          Review of Systems   Constitutional: Negative. HENT: Negative. Eyes: Negative. Respiratory: Negative. Cardiovascular: Negative. Gastrointestinal: Negative. Endocrine: Negative. Genitourinary: Negative. Musculoskeletal: Negative. Arthralgias: bilateral heel pain ongoing. Skin: Negative. Allergic/Immunologic: Negative. Neurological: Negative. Hematological: Negative. Psychiatric/Behavioral: Negative. Objective:   Physical Exam  Constitutional:       General: He is not in acute distress. Appearance: He is well-developed. HENT:      Head: Normocephalic and atraumatic. Right Ear: External ear normal.      Left Ear: External ear normal.      Mouth/Throat:      Pharynx: No oropharyngeal exudate. Eyes:      General: No scleral icterus. Conjunctiva/sclera: Conjunctivae normal.   Neck:      Musculoskeletal: Neck supple. Thyroid: No thyromegaly. Cardiovascular:      Rate and Rhythm: Normal rate and regular rhythm. Heart sounds: Normal heart sounds. No murmur. Pulmonary:      Effort: Pulmonary effort is normal. No respiratory distress. Breath sounds: Normal breath sounds. No wheezing. Chest:      Chest wall: Deformity (pectus excavatum) present. Abdominal:      General: Bowel sounds are normal. There is no distension. Palpations: Abdomen is soft. Tenderness: There is no abdominal tenderness. There is no rebound. Musculoskeletal: Normal range of motion. General: No tenderness. Skin:     General: Skin is warm and dry. Findings: No erythema or rash. Neurological:      Mental Status: He is alert and oriented to person, place, and time. Psychiatric:         Behavior: Behavior normal.         Thought Content:  Thought content normal. Judgment: Judgment normal.       /64 (Site: Left Upper Arm, Position: Sitting, Cuff Size: Large Adult)   Pulse 68   Ht 6' 2\" (1.88 m)   Wt 224 lb (101.6 kg)   BMI 28.76 kg/m²   Hospital Outpatient Visit on 03/09/2021   Component Date Value Ref Range Status    WBC 03/09/2021 3.6  3.5 - 11.3 k/uL Final    RBC 03/09/2021 5.03  4.21 - 5.77 m/uL Final    Hemoglobin 03/09/2021 15.6  13.0 - 17.0 g/dL Final    Hematocrit 03/09/2021 46.6  40.7 - 50.3 % Final    MCV 03/09/2021 92.6  82.6 - 102.9 fL Final    MCH 03/09/2021 31.0  25.2 - 33.5 pg Final    MCHC 03/09/2021 33.5  25.2 - 33.5 g/dL Final    RDW 03/09/2021 12.6  11.8 - 14.4 % Final    Platelets 65/45/1443 177  138 - 453 k/uL Final    MPV 03/09/2021 9.4  8.1 - 13.5 fL Final    NRBC Automated 03/09/2021 0.0  0.0 per 100 WBC Final    Differential Type 03/09/2021 NOT REPORTED   Final    Seg Neutrophils 03/09/2021 61  36 - 65 % Final    Lymphocytes 03/09/2021 28  24 - 43 % Final    Monocytes 03/09/2021 9  3 - 12 % Final    Eosinophils % 03/09/2021 2  1 - 4 % Final    Basophils 03/09/2021 0  0 - 2 % Final    Immature Granulocytes 03/09/2021 0  0 % Final    Segs Absolute 03/09/2021 2.22  1.50 - 8.10 k/uL Final    Absolute Lymph # 03/09/2021 0.99* 1.10 - 3.70 k/uL Final    Absolute Mono # 03/09/2021 0.31  0.10 - 1.20 k/uL Final    Absolute Eos # 03/09/2021 0.06  0.00 - 0.44 k/uL Final    Basophils Absolute 03/09/2021 <0.03  0.00 - 0.20 k/uL Final    Absolute Immature Granulocyte 03/09/2021 <0.03  0.00 - 0.30 k/uL Final    WBC Morphology 03/09/2021 NOT REPORTED   Final    RBC Morphology 03/09/2021 NOT REPORTED   Final    Platelet Estimate 82/86/8980 NOT REPORTED   Final    Glucose 03/09/2021 100* 70 - 99 mg/dL Final    BUN 03/09/2021 11  6 - 20 mg/dL Final    CREATININE 03/09/2021 0.94  0.70 - 1.20 mg/dL Final    Bun/Cre Ratio 03/09/2021 12  9 - 20 Final    Calcium 03/09/2021 9.9  8.6 - 10.4 mg/dL Final    Sodium 03/09/2021 138  135 - 144 mmol/L Final    Potassium 03/09/2021 4.0  3.7 - 5.3 mmol/L Final    Chloride 03/09/2021 102  98 - 107 mmol/L Final    CO2 03/09/2021 27  20 - 31 mmol/L Final    Anion Gap 03/09/2021 9  9 - 17 mmol/L Final    Alkaline Phosphatase 03/09/2021 61  40 - 129 U/L Final    ALT 03/09/2021 25  5 - 41 U/L Final    AST 03/09/2021 22  <40 U/L Final    Total Bilirubin 03/09/2021 0.76  0.3 - 1.2 mg/dL Final    Total Protein 03/09/2021 7.1  6.4 - 8.3 g/dL Final    Albumin 03/09/2021 4.7  3.5 - 5.2 g/dL Final    Albumin/Globulin Ratio 03/09/2021 2.0  1.0 - 2.5 Final    GFR Non- 03/09/2021 >60  >60 mL/min Final    GFR  03/09/2021 >60  >60 mL/min Final    GFR Comment 03/09/2021        Final    GFR Staging 03/09/2021 NOT REPORTED   Final    Cholesterol 03/09/2021 162  <200 mg/dL Final    HDL 03/09/2021 31* >40 mg/dL Final    LDL Cholesterol 03/09/2021 95  0 - 130 mg/dL Final    Chol/HDL Ratio 03/09/2021 5.2* <5 Final    Triglycerides 03/09/2021 178* <150 mg/dL Final    VLDL 03/09/2021 NOT REPORTED* 1 - 30 mg/dL Final       Assessment:       Encounter Diagnoses   Name Primary?  Pure hypercholesterolemia Yes    Vasodepressor syncope     Plantar fasciitis, bilateral     Impaired fasting blood sugar            Plan:          Hyperlipidemia; improved/stable. Cont. lipitor    Syncope; quiescent over the last year. Plan to cont. celexa for now. Plantar fasciitis. Discussed heel cup trial.  Can inject if persistent or progressive. Ongoing but improved at present. More activity related exacerbations at present. Tolerable. Minimal ifbs this check. Will follow trend. Stable /low at present. 100-improved. Watching sugar in the diet.

## 2021-03-16 NOTE — PATIENT INSTRUCTIONS
03/09/2021 9  9 - 17 mmol/L Final    Alkaline Phosphatase 03/09/2021 61  40 - 129 U/L Final    ALT 03/09/2021 25  5 - 41 U/L Final    AST 03/09/2021 22  <40 U/L Final    Total Bilirubin 03/09/2021 0.76  0.3 - 1.2 mg/dL Final    Total Protein 03/09/2021 7.1  6.4 - 8.3 g/dL Final    Albumin 03/09/2021 4.7  3.5 - 5.2 g/dL Final    Albumin/Globulin Ratio 03/09/2021 2.0  1.0 - 2.5 Final    GFR Non- 03/09/2021 >60  >60 mL/min Final    GFR  03/09/2021 >60  >60 mL/min Final    GFR Comment 03/09/2021        Final    Comment: Average GFR for 38-51 years old:   80 mL/min/1.73sq m  Chronic Kidney Disease:   <60 mL/min/1.73sq m  Kidney failure:   <15 mL/min/1.73sq m              eGFR calculated using average adult body mass. Additional eGFR calculator available at:        [email or web address]            GFR Staging 03/09/2021 NOT REPORTED   Final    Cholesterol 03/09/2021 162  <200 mg/dL Final    Comment:    Cholesterol Guidelines:      <200  Desirable   200-240  Borderline      >240  Undesirable         HDL 03/09/2021 31* >40 mg/dL Final    Comment:    HDL Guidelines:    <40     Undesirable   40-59    Borderline    >59     Desirable         LDL Cholesterol 03/09/2021 95  0 - 130 mg/dL Final    Comment:    LDL Guidelines:     <100    Desirable   100-129   Near to/above Desirable   130-159   Borderline      >159   Undesirable     Direct (measured) LDL and calculated LDL are not interchangeable tests.  Chol/HDL Ratio 03/09/2021 5.2* <5 Final            Triglycerides 03/09/2021 178* <150 mg/dL Final    Comment:    Triglyceride Guidelines:     <150   Desirable   150-199  Borderline   200-499  High     >499   Very high   Based on AHA Guidelines for fasting triglyceride, October 2012.          VLDL 03/09/2021 NOT REPORTED* 1 - 30 mg/dL Final

## 2021-08-18 ENCOUNTER — OFFICE VISIT (OUTPATIENT)
Dept: CARDIOLOGY | Age: 41
End: 2021-08-18
Payer: COMMERCIAL

## 2021-08-18 ENCOUNTER — IMMUNIZATION (OUTPATIENT)
Dept: LAB | Age: 41
End: 2021-08-18
Payer: COMMERCIAL

## 2021-08-18 VITALS
BODY MASS INDEX: 28.23 KG/M2 | HEART RATE: 55 BPM | SYSTOLIC BLOOD PRESSURE: 127 MMHG | WEIGHT: 220 LBS | DIASTOLIC BLOOD PRESSURE: 81 MMHG | HEIGHT: 74 IN

## 2021-08-18 DIAGNOSIS — E78.5 HYPERLIPIDEMIA, UNSPECIFIED HYPERLIPIDEMIA TYPE: Primary | ICD-10-CM

## 2021-08-18 DIAGNOSIS — R55 VASODEPRESSOR SYNCOPE: ICD-10-CM

## 2021-08-18 PROCEDURE — 99214 OFFICE O/P EST MOD 30 MIN: CPT | Performed by: INTERNAL MEDICINE

## 2021-08-18 PROCEDURE — 91301 COVID-19, MODERNA VACCINE 100MCG/0.5ML DOSE: CPT | Performed by: INTERNAL MEDICINE

## 2021-08-18 PROCEDURE — 93000 ELECTROCARDIOGRAM COMPLETE: CPT | Performed by: INTERNAL MEDICINE

## 2021-08-18 PROCEDURE — 0011A COVID-19, MODERNA VACCINE 100MCG/0.5ML DOSE: CPT | Performed by: INTERNAL MEDICINE

## 2021-08-18 NOTE — PROGRESS NOTES
Today's Date: 8/18/2021  Patient's Name: Pascual Ortiz  Patient's age: 36 y.o., 1980    Subjective:  Pascual Ortiz is being seen in clinic today regarding   Chief Complaint   Patient presents with    Hyperlipidemia   Vasodepressor syncope. he is doing well from a cardiac standpoint. No chest pain, no dyspnea, no PND, no syncope or pre-syncope, no orthopnea. He denies any palpitations. He reports being active. Past Medical History:   has a past medical history of Erectile dysfunction, Hyperlipidemia, Mild mitral valve prolapse, Pectus excavatum, Prediabetes, and Vasodepressor syncope. Past Surgical History:   has no past surgical history on file. Home Medications:  Prior to Admission medications    Medication Sig Start Date End Date Taking? Authorizing Provider   atorvastatin (LIPITOR) 10 MG tablet TAKE 1 TABLET BY MOUTH ONCE DAILY 9/15/20   Pepper Gomez MD   citalopram (CELEXA) 10 MG tablet TAKE 1 TABLET BY MOUTH ONCE DAILY 9/15/20   Pepper Gomez MD       Allergies:  Patient has no known allergies. Social History:   reports that he has never smoked. He has never used smokeless tobacco. He reports current alcohol use. He reports that he does not use drugs. Family History: family history includes Cancer in his father and maternal grandmother; Chronic Infections in his maternal cousin; Colon Cancer in his maternal grandfather and paternal grandmother; Heart Attack in his father and maternal grandfather; Heart Disease in his father, paternal grandfather, and another family member; Hypertension in an other family member. No h/o sudden cardiac death. No for premature CAD    REVIEW OF SYSTEMS:    · Constitutional: there has been no unanticipated weight loss. There's been No change in energy level, No change in activity level. · Eyes: No visual changes or diplopia. No scleral icterus. · ENT: No Headaches, hearing loss or vertigo.  No mouth sores or sore throat. · Cardiovascular: see above  · Respiratory: see above  · Gastrointestinal: No abdominal pain, appetite loss, blood in stools. · Genitourinary: No dysuria, trouble voiding, or hematuria. · Musculoskeletal:  No gait disturbance, No weakness or joint complaints. · Integumentary: No rash or pruritis. · Neurological: No headache or diplopia. No tingling  · Psychiatric: No anxiety, or depression. · Endocrine: No temperature intolerance. · Hematologic/Lymphatic: No abnormal bruising or bleeding, blood clots or swollen lymph nodes. · Allergic/Immunologic: No nasal congestion or hives. PHYSICAL EXAM:      Pulse 55   Ht 6' 2\" (1.88 m)   Wt 220 lb (99.8 kg)   BMI 28.25 kg/m²    Vitals:    08/18/21 0837   BP: 127/81   Pulse: 55       Constitutional and General Appearance: alert, cooperative, no distress and appears stated age  HEENT: PERRL, no cervical lymphadenopathy. No masses palpable. Normal oral mucosa  Respiratory:  · Normal excursion and expansion without use of accessory muscles  · Resp Auscultation: Good respiratory effort. No for increased work of breathing. On auscultation: clear to auscultation bilaterally  Cardiovascular:  · Heart tones are crisp and normal. regular S1 and S2.  · Jugular venous pulsation Normal  · The carotid upstroke is normal in amplitude and contour without delay or bruit   Abdomen:   · soft  · Bowel sounds present  Extremities:  ·  No edema  Neurological:  · Alert and oriented. Cardiac Data:  EKG: Sinus bradycardia    Labs:     CBC: No results for input(s): WBC, HGB, HCT, PLT in the last 72 hours. BMP: No results for input(s): NA, K, CO2, BUN, CREATININE, LABGLOM, GLUCOSE in the last 72 hours. PT/INR: No results for input(s): PROTIME, INR in the last 72 hours. FASTING LIPID PANEL:  Lab Results   Component Value Date    HDL 31 03/09/2021    TRIG 178 03/09/2021     LIVER PROFILE:No results for input(s): AST, ALT, LABALBU in the last 72 hours.     Problem List:  Patient Active Problem List   Diagnosis    Vasodepressor syncope    MVP (mitral valve prolapse)    Mild mitral valve prolapse    Hyperlipidemia        Assessment and plan:    -Vasodepressor syncope, +tilt table test, asymptomatic on SSRI. Liberalize po fluid intake  -H/o asymptomatic bradycardia  -Overweight - encouraged diet, exercise, and discussed weight loss extensively. -Hyperlipidemia- LDL 95 on 03/09/21. Stable. Continue current dose of lipitor. -RTC 12 months.     Sean Moreno, [place] [facility]  [phone]

## 2021-09-10 ENCOUNTER — HOSPITAL ENCOUNTER (OUTPATIENT)
Dept: LAB | Age: 41
Discharge: HOME | End: 2021-09-10
Payer: COMMERCIAL

## 2021-09-10 DIAGNOSIS — E78.00 PURE HYPERCHOLESTEROLEMIA: ICD-10-CM

## 2021-09-10 DIAGNOSIS — R73.01 IMPAIRED FASTING BLOOD SUGAR: ICD-10-CM

## 2021-09-10 LAB
ABSOLUTE EOS #: 0.09 K/UL (ref 0–0.44)
ABSOLUTE IMMATURE GRANULOCYTE: <0.03 K/UL (ref 0–0.3)
ABSOLUTE LYMPH #: 1.08 K/UL (ref 1.1–3.7)
ABSOLUTE MONO #: 0.29 K/UL (ref 0.1–1.2)
ALBUMIN SERPL-MCNC: 5 G/DL (ref 3.5–5.2)
ALBUMIN/GLOBULIN RATIO: 1.9 (ref 1–2.5)
ALP BLD-CCNC: 63 U/L (ref 40–129)
ALT SERPL-CCNC: 26 U/L (ref 5–41)
ANION GAP SERPL CALCULATED.3IONS-SCNC: 9 MMOL/L (ref 9–17)
AST SERPL-CCNC: 28 U/L
BASOPHILS # BLD: 1 % (ref 0–2)
BASOPHILS ABSOLUTE: <0.03 K/UL (ref 0–0.2)
BILIRUB SERPL-MCNC: 0.73 MG/DL (ref 0.3–1.2)
BUN BLDV-MCNC: 11 MG/DL (ref 6–20)
BUN/CREAT BLD: 13 (ref 9–20)
CALCIUM SERPL-MCNC: 9.9 MG/DL (ref 8.6–10.4)
CHLORIDE BLD-SCNC: 104 MMOL/L (ref 98–107)
CHOLESTEROL/HDL RATIO: 4.4
CHOLESTEROL: 153 MG/DL
CO2: 27 MMOL/L (ref 20–31)
CREAT SERPL-MCNC: 0.84 MG/DL (ref 0.7–1.2)
DIFFERENTIAL TYPE: ABNORMAL
EOSINOPHILS RELATIVE PERCENT: 2 % (ref 1–4)
ESTIMATED AVERAGE GLUCOSE: 97 MG/DL
GFR AFRICAN AMERICAN: >60 ML/MIN
GFR NON-AFRICAN AMERICAN: >60 ML/MIN
GFR SERPL CREATININE-BSD FRML MDRD: NORMAL ML/MIN/{1.73_M2}
GFR SERPL CREATININE-BSD FRML MDRD: NORMAL ML/MIN/{1.73_M2}
GLUCOSE BLD-MCNC: 99 MG/DL (ref 70–99)
HBA1C MFR BLD: 5 % (ref 4–6)
HCT VFR BLD CALC: 46.5 % (ref 40.7–50.3)
HDLC SERPL-MCNC: 35 MG/DL
HEMOGLOBIN: 15.7 G/DL (ref 13–17)
IMMATURE GRANULOCYTES: 0 %
LDL CHOLESTEROL: 90 MG/DL (ref 0–130)
LYMPHOCYTES # BLD: 26 % (ref 24–43)
MCH RBC QN AUTO: 31.5 PG (ref 25.2–33.5)
MCHC RBC AUTO-ENTMCNC: 33.8 G/DL (ref 25.2–33.5)
MCV RBC AUTO: 93.4 FL (ref 82.6–102.9)
MONOCYTES # BLD: 7 % (ref 3–12)
NRBC AUTOMATED: 0 PER 100 WBC
PDW BLD-RTO: 12.5 % (ref 11.8–14.4)
PLATELET # BLD: 183 K/UL (ref 138–453)
PLATELET ESTIMATE: ABNORMAL
PMV BLD AUTO: 9.6 FL (ref 8.1–13.5)
POTASSIUM SERPL-SCNC: 4.2 MMOL/L (ref 3.7–5.3)
RBC # BLD: 4.98 M/UL (ref 4.21–5.77)
RBC # BLD: ABNORMAL 10*6/UL
SEG NEUTROPHILS: 64 % (ref 36–65)
SEGMENTED NEUTROPHILS ABSOLUTE COUNT: 2.74 K/UL (ref 1.5–8.1)
SODIUM BLD-SCNC: 140 MMOL/L (ref 135–144)
TOTAL PROTEIN: 7.7 G/DL (ref 6.4–8.3)
TRIGL SERPL-MCNC: 139 MG/DL
VLDLC SERPL CALC-MCNC: ABNORMAL MG/DL (ref 1–30)
WBC # BLD: 4.2 K/UL (ref 3.5–11.3)
WBC # BLD: ABNORMAL 10*3/UL

## 2021-09-10 PROCEDURE — 85025 COMPLETE CBC W/AUTO DIFF WBC: CPT

## 2021-09-10 PROCEDURE — 80053 COMPREHEN METABOLIC PANEL: CPT

## 2021-09-10 PROCEDURE — 83036 HEMOGLOBIN GLYCOSYLATED A1C: CPT

## 2021-09-10 PROCEDURE — 80061 LIPID PANEL: CPT

## 2021-09-10 PROCEDURE — 36415 COLL VENOUS BLD VENIPUNCTURE: CPT

## 2021-09-15 ENCOUNTER — IMMUNIZATION (OUTPATIENT)
Dept: LAB | Age: 41
End: 2021-09-15
Payer: COMMERCIAL

## 2021-09-15 PROCEDURE — 91301 COVID-19, MODERNA VACCINE 100MCG/0.5ML DOSE: CPT | Performed by: INTERNAL MEDICINE

## 2021-09-15 PROCEDURE — 0012A COVID-19, MODERNA VACCINE 100MCG/0.5ML DOSE: CPT | Performed by: INTERNAL MEDICINE

## 2021-09-28 ENCOUNTER — OFFICE VISIT (OUTPATIENT)
Dept: FAMILY MEDICINE CLINIC | Age: 41
End: 2021-09-28
Payer: COMMERCIAL

## 2021-09-28 VITALS
DIASTOLIC BLOOD PRESSURE: 80 MMHG | OXYGEN SATURATION: 98 % | HEIGHT: 74 IN | BODY MASS INDEX: 27.98 KG/M2 | WEIGHT: 218 LBS | TEMPERATURE: 97.3 F | HEART RATE: 60 BPM | SYSTOLIC BLOOD PRESSURE: 122 MMHG

## 2021-09-28 DIAGNOSIS — M72.2 PLANTAR FASCIITIS, BILATERAL: ICD-10-CM

## 2021-09-28 DIAGNOSIS — E78.5 HYPERLIPIDEMIA, UNSPECIFIED HYPERLIPIDEMIA TYPE: Primary | ICD-10-CM

## 2021-09-28 DIAGNOSIS — R73.01 IMPAIRED FASTING BLOOD SUGAR: ICD-10-CM

## 2021-09-28 DIAGNOSIS — R55 VASODEPRESSOR SYNCOPE: ICD-10-CM

## 2021-09-28 PROCEDURE — 99214 OFFICE O/P EST MOD 30 MIN: CPT | Performed by: FAMILY MEDICINE

## 2021-09-28 RX ORDER — CITALOPRAM 10 MG/1
TABLET ORAL
Qty: 90 TABLET | Refills: 3 | Status: SHIPPED | OUTPATIENT
Start: 2021-09-28 | End: 2022-09-29 | Stop reason: SDUPTHER

## 2021-09-28 RX ORDER — ATORVASTATIN CALCIUM 10 MG/1
TABLET, FILM COATED ORAL
Qty: 90 TABLET | Refills: 3 | Status: SHIPPED | OUTPATIENT
Start: 2021-09-28 | End: 2022-09-29 | Stop reason: SDUPTHER

## 2021-09-28 SDOH — ECONOMIC STABILITY: FOOD INSECURITY: WITHIN THE PAST 12 MONTHS, YOU WORRIED THAT YOUR FOOD WOULD RUN OUT BEFORE YOU GOT MONEY TO BUY MORE.: PATIENT DECLINED

## 2021-09-28 SDOH — ECONOMIC STABILITY: FOOD INSECURITY: WITHIN THE PAST 12 MONTHS, THE FOOD YOU BOUGHT JUST DIDN'T LAST AND YOU DIDN'T HAVE MONEY TO GET MORE.: PATIENT DECLINED

## 2021-09-28 ASSESSMENT — ENCOUNTER SYMPTOMS
ALLERGIC/IMMUNOLOGIC NEGATIVE: 1
RESPIRATORY NEGATIVE: 1
GASTROINTESTINAL NEGATIVE: 1
EYES NEGATIVE: 1

## 2021-09-28 ASSESSMENT — SOCIAL DETERMINANTS OF HEALTH (SDOH): HOW HARD IS IT FOR YOU TO PAY FOR THE VERY BASICS LIKE FOOD, HOUSING, MEDICAL CARE, AND HEATING?: PATIENT DECLINED

## 2021-09-28 NOTE — PATIENT INSTRUCTIONS
Hospital Outpatient Visit on 09/10/2021   Component Date Value Ref Range Status    Hemoglobin A1C 09/10/2021 5.0  4.0 - 6.0 % Final    Estimated Avg Glucose 09/10/2021 97  mg/dL Final    Comment: The ADA and AACC recommend providing the estimated average glucose result to permit better   patient understanding of their HBA1c result.       WBC 09/10/2021 4.2  3.5 - 11.3 k/uL Final    RBC 09/10/2021 4.98  4.21 - 5.77 m/uL Final    Hemoglobin 09/10/2021 15.7  13.0 - 17.0 g/dL Final    Hematocrit 09/10/2021 46.5  40.7 - 50.3 % Final    MCV 09/10/2021 93.4  82.6 - 102.9 fL Final    MCH 09/10/2021 31.5  25.2 - 33.5 pg Final    MCHC 09/10/2021 33.8* 25.2 - 33.5 g/dL Final    RDW 09/10/2021 12.5  11.8 - 14.4 % Final    Platelets 91/66/4765 183  138 - 453 k/uL Final    MPV 09/10/2021 9.6  8.1 - 13.5 fL Final    NRBC Automated 09/10/2021 0.0  0.0 per 100 WBC Final    Differential Type 09/10/2021 NOT REPORTED   Final    Seg Neutrophils 09/10/2021 64  36 - 65 % Final    Lymphocytes 09/10/2021 26  24 - 43 % Final    Monocytes 09/10/2021 7  3 - 12 % Final    Eosinophils % 09/10/2021 2  1 - 4 % Final    Basophils 09/10/2021 1  0 - 2 % Final    Immature Granulocytes 09/10/2021 0  0 % Final    Segs Absolute 09/10/2021 2.74  1.50 - 8.10 k/uL Final    Absolute Lymph # 09/10/2021 1.08* 1.10 - 3.70 k/uL Final    Absolute Mono # 09/10/2021 0.29  0.10 - 1.20 k/uL Final    Absolute Eos # 09/10/2021 0.09  0.00 - 0.44 k/uL Final    Basophils Absolute 09/10/2021 <0.03  0.00 - 0.20 k/uL Final    Absolute Immature Granulocyte 09/10/2021 <0.03  0.00 - 0.30 k/uL Final    WBC Morphology 09/10/2021 NOT REPORTED   Final    RBC Morphology 09/10/2021 NOT REPORTED   Final    Platelet Estimate 48/91/8571 NOT REPORTED   Final    Glucose 09/10/2021 99  70 - 99 mg/dL Final    BUN 09/10/2021 11  6 - 20 mg/dL Final    CREATININE 09/10/2021 0.84  0.70 - 1.20 mg/dL Final    Bun/Cre Ratio 09/10/2021 13  9 - 20 Final    Calcium 09/10/2021 9.9  8.6 - 10.4 mg/dL Final    Sodium 09/10/2021 140  135 - 144 mmol/L Final    Potassium 09/10/2021 4.2  3.7 - 5.3 mmol/L Final    Chloride 09/10/2021 104  98 - 107 mmol/L Final    CO2 09/10/2021 27  20 - 31 mmol/L Final    Anion Gap 09/10/2021 9  9 - 17 mmol/L Final    Alkaline Phosphatase 09/10/2021 63  40 - 129 U/L Final    ALT 09/10/2021 26  5 - 41 U/L Final    AST 09/10/2021 28  <40 U/L Final    Total Bilirubin 09/10/2021 0.73  0.3 - 1.2 mg/dL Final    Total Protein 09/10/2021 7.7  6.4 - 8.3 g/dL Final    Albumin 09/10/2021 5.0  3.5 - 5.2 g/dL Final    Albumin/Globulin Ratio 09/10/2021 1.9  1.0 - 2.5 Final    GFR Non- 09/10/2021 >60  >60 mL/min Final    GFR  09/10/2021 >60  >60 mL/min Final    GFR Comment 09/10/2021        Final    Comment: Average GFR for 38-51 years old:   80 mL/min/1.73sq m  Chronic Kidney Disease:   <60 mL/min/1.73sq m  Kidney failure:   <15 mL/min/1.73sq m              eGFR calculated using average adult body mass. Additional eGFR calculator available at:        [email or web address]            GFR Staging 09/10/2021 NOT REPORTED   Final    Cholesterol 09/10/2021 153  <200 mg/dL Final    Comment:    Cholesterol Guidelines:      <200  Desirable   200-240  Borderline      >240  Undesirable         HDL 09/10/2021 35* >40 mg/dL Final    Comment:    HDL Guidelines:    <40     Undesirable   40-59    Borderline    >59     Desirable         LDL Cholesterol 09/10/2021 90  0 - 130 mg/dL Final    Comment:    LDL Guidelines:     <100    Desirable   100-129   Near to/above Desirable   130-159   Borderline      >159   Undesirable     Direct (measured) LDL and calculated LDL are not interchangeable tests.       Chol/HDL Ratio 09/10/2021 4.4  <5 Final            Triglycerides 09/10/2021 139  <150 mg/dL Final    Comment:    Triglyceride Guidelines:     <150   Desirable   150-199  Borderline   200-499  High     >499 Very high   Based on AHA Guidelines for fasting triglyceride, October 2012.          VLDL 09/10/2021 NOT REPORTED  1 - 30 mg/dL Final

## 2021-09-28 NOTE — PROGRESS NOTES
Subjective:      Patient ID: Delano Montero is a 36 y.o. male. Hyperlipidemia       Routine  follow up on chronic medical conditions, refills, and review of updated labs. I have reviewed the patient's medical history in detail and updated the computerized patient record. Feeling well at present. No interval or illness to report. 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 Heel pain still off and on but much better then in the past, manageable. Discussed heel cups, especially with work boots. He does restrict some activities like running that aggravate the issue. Tolerating statin. Compliant with meds without concerns for side effects. No dizziness or syncope concerns over the interval.    Past Medical History:   Diagnosis Date    Erectile dysfunction     Hyperlipidemia     Mild mitral valve prolapse     with mild regurgitation, asymptomatic.  Pectus excavatum     Prediabetes     Vasodepressor syncope     since 1999. History reviewed. No pertinent surgical history. Current Outpatient Medications   Medication Sig Dispense Refill    atorvastatin (LIPITOR) 10 MG tablet TAKE 1 TABLET BY MOUTH ONCE DAILY 90 tablet 3    citalopram (CELEXA) 10 MG tablet TAKE 1 TABLET BY MOUTH ONCE DAILY 90 tablet 3     No current facility-administered medications for this visit. No Known Allergies  Social History     Tobacco Use    Smoking status: Never Smoker    Smokeless tobacco: Never Used   Substance Use Topics    Alcohol use:  Yes     Alcohol/week: 0.0 standard drinks     Comment: occasional, less than 1 drink per day    Drug use: No     Family History   Problem Relation Age of Onset    Cancer Maternal Grandmother         bone    Colon Cancer Maternal Grandfather     Heart Attack Maternal Grandfather     Colon Cancer Paternal Grandmother     Heart Disease Paternal Grandfather     Heart Attack Father         2 or 3    Cancer Father Waldenstrom's macroglobulinemia    Heart Disease Father         unknown type, 35s    Chronic Infections Maternal Cousin     Heart Disease Other     Hypertension Other          Review of Systems   Constitutional: Negative. HENT: Negative. Eyes: Negative. Respiratory: Negative. Cardiovascular: Negative. Gastrointestinal: Negative. Endocrine: Negative. Genitourinary: Negative. Musculoskeletal: Negative. Arthralgias: bilateral heel pain ongoing. Skin: Negative. Allergic/Immunologic: Negative. Neurological: Negative. Hematological: Negative. Psychiatric/Behavioral: Negative. Objective:   Physical Exam  Constitutional:       General: He is not in acute distress. Appearance: He is well-developed. HENT:      Head: Normocephalic and atraumatic. Right Ear: External ear normal.      Left Ear: External ear normal.      Mouth/Throat:      Pharynx: No oropharyngeal exudate. Eyes:      General: No scleral icterus. Conjunctiva/sclera: Conjunctivae normal.   Neck:      Thyroid: No thyromegaly. Cardiovascular:      Rate and Rhythm: Normal rate and regular rhythm. Heart sounds: Normal heart sounds. No murmur heard. Pulmonary:      Effort: Pulmonary effort is normal. No respiratory distress. Breath sounds: Normal breath sounds. No wheezing. Chest:      Chest wall: Deformity (pectus excavatum) present. Abdominal:      General: Bowel sounds are normal. There is no distension. Palpations: Abdomen is soft. Tenderness: There is no abdominal tenderness. There is no rebound. Musculoskeletal:         General: No tenderness. Normal range of motion. Cervical back: Neck supple. Skin:     General: Skin is warm and dry. Findings: No erythema or rash. Neurological:      Mental Status: He is alert and oriented to person, place, and time. Psychiatric:         Behavior: Behavior normal.         Thought Content:  Thought content normal. 0. 84  0.70 - 1.20 mg/dL Final    Bun/Cre Ratio 09/10/2021 13  9 - 20 Final    Calcium 09/10/2021 9.9  8.6 - 10.4 mg/dL Final    Sodium 09/10/2021 140  135 - 144 mmol/L Final    Potassium 09/10/2021 4.2  3.7 - 5.3 mmol/L Final    Chloride 09/10/2021 104  98 - 107 mmol/L Final    CO2 09/10/2021 27  20 - 31 mmol/L Final    Anion Gap 09/10/2021 9  9 - 17 mmol/L Final    Alkaline Phosphatase 09/10/2021 63  40 - 129 U/L Final    ALT 09/10/2021 26  5 - 41 U/L Final    AST 09/10/2021 28  <40 U/L Final    Total Bilirubin 09/10/2021 0.73  0.3 - 1.2 mg/dL Final    Total Protein 09/10/2021 7.7  6.4 - 8.3 g/dL Final    Albumin 09/10/2021 5.0  3.5 - 5.2 g/dL Final    Albumin/Globulin Ratio 09/10/2021 1.9  1.0 - 2.5 Final    GFR Non- 09/10/2021 >60  >60 mL/min Final    GFR  09/10/2021 >60  >60 mL/min Final    GFR Comment 09/10/2021        Final    GFR Staging 09/10/2021 NOT REPORTED   Final    Cholesterol 09/10/2021 153  <200 mg/dL Final    HDL 09/10/2021 35* >40 mg/dL Final    LDL Cholesterol 09/10/2021 90  0 - 130 mg/dL Final    Chol/HDL Ratio 09/10/2021 4.4  <5 Final    Triglycerides 09/10/2021 139  <150 mg/dL Final    VLDL 09/10/2021 NOT REPORTED  1 - 30 mg/dL Final       Assessment:       Encounter Diagnoses   Name Primary?  Hyperlipidemia, unspecified hyperlipidemia type Yes    Vasodepressor syncope     Plantar fasciitis, bilateral     Impaired fasting blood sugar            Plan:          Hyperlipidemia; improved/stable. Cont. lipitor    Syncope; quiescent over the last year. Plan to cont. celexa for now. Plantar fasciitis. Discussed heel cup trial.  Can inject if persistent or progressive. Ongoing but improved at present, going down to 8hr shifts has helped. More activity related exacerbations at present. Tolerable. Minimal ifbs this check. Will follow trend. Stable /low at present. 100-improved. Watching sugar in the diet.     Vaccinated against covid    offered tdap update.

## 2021-10-11 RX ORDER — ATORVASTATIN CALCIUM 10 MG/1
TABLET, FILM COATED ORAL
Qty: 90 TABLET | Refills: 0 | OUTPATIENT
Start: 2021-10-11

## 2021-10-11 RX ORDER — CITALOPRAM 10 MG/1
TABLET ORAL
Qty: 90 TABLET | Refills: 0 | OUTPATIENT
Start: 2021-10-11

## 2022-03-09 DIAGNOSIS — R73.01 IMPAIRED FASTING BLOOD SUGAR: ICD-10-CM

## 2022-03-09 DIAGNOSIS — E78.5 HYPERLIPIDEMIA, UNSPECIFIED HYPERLIPIDEMIA TYPE: ICD-10-CM

## 2022-03-24 ENCOUNTER — HOSPITAL ENCOUNTER (OUTPATIENT)
Dept: LAB | Age: 42
Discharge: HOME | End: 2022-03-24
Payer: COMMERCIAL

## 2022-03-24 DIAGNOSIS — Z00.00 ROUTINE GENERAL MEDICAL EXAMINATION AT A HEALTH CARE FACILITY: ICD-10-CM

## 2022-03-24 DIAGNOSIS — E78.5 HYPERLIPIDEMIA, UNSPECIFIED HYPERLIPIDEMIA TYPE: ICD-10-CM

## 2022-03-24 DIAGNOSIS — E78.5 HYPERLIPIDEMIA, UNSPECIFIED HYPERLIPIDEMIA TYPE: Primary | ICD-10-CM

## 2022-03-24 LAB
ABSOLUTE EOS #: 0.07 K/UL (ref 0–0.44)
ABSOLUTE IMMATURE GRANULOCYTE: <0.03 K/UL (ref 0–0.3)
ABSOLUTE LYMPH #: 0.94 K/UL (ref 1.1–3.7)
ABSOLUTE MONO #: 0.31 K/UL (ref 0.1–1.2)
ALBUMIN SERPL-MCNC: 5 G/DL (ref 3.5–5.2)
ALBUMIN/GLOBULIN RATIO: 1.9 (ref 1–2.5)
ALP BLD-CCNC: 62 U/L (ref 40–129)
ALT SERPL-CCNC: 23 U/L (ref 5–41)
ANION GAP SERPL CALCULATED.3IONS-SCNC: 9 MMOL/L (ref 9–17)
AST SERPL-CCNC: 22 U/L
BASOPHILS # BLD: 0 % (ref 0–2)
BASOPHILS ABSOLUTE: <0.03 K/UL (ref 0–0.2)
BILIRUB SERPL-MCNC: 0.8 MG/DL (ref 0.3–1.2)
BUN BLDV-MCNC: 17 MG/DL (ref 6–20)
BUN/CREAT BLD: 19 (ref 9–20)
CALCIUM SERPL-MCNC: 10.1 MG/DL (ref 8.6–10.4)
CHLORIDE BLD-SCNC: 104 MMOL/L (ref 98–107)
CHOLESTEROL, FASTING: 153 MG/DL
CHOLESTEROL/HDL RATIO: 4.4
CO2: 27 MMOL/L (ref 20–31)
CREAT SERPL-MCNC: 0.88 MG/DL (ref 0.7–1.2)
EOSINOPHILS RELATIVE PERCENT: 2 % (ref 1–4)
ESTIMATED AVERAGE GLUCOSE: 100 MG/DL
GFR AFRICAN AMERICAN: >60 ML/MIN
GFR NON-AFRICAN AMERICAN: >60 ML/MIN
GFR SERPL CREATININE-BSD FRML MDRD: ABNORMAL ML/MIN/{1.73_M2}
GLUCOSE BLD-MCNC: 108 MG/DL (ref 70–99)
HBA1C MFR BLD: 5.1 % (ref 4–6)
HCT VFR BLD CALC: 47.3 % (ref 40.7–50.3)
HDLC SERPL-MCNC: 35 MG/DL
HEMOGLOBIN: 16.1 G/DL (ref 13–17)
IMMATURE GRANULOCYTES: 0 %
LDL CHOLESTEROL: 86 MG/DL (ref 0–130)
LYMPHOCYTES # BLD: 27 % (ref 24–43)
MCH RBC QN AUTO: 31.6 PG (ref 25.2–33.5)
MCHC RBC AUTO-ENTMCNC: 34 G/DL (ref 25.2–33.5)
MCV RBC AUTO: 92.7 FL (ref 82.6–102.9)
MONOCYTES # BLD: 9 % (ref 3–12)
NRBC AUTOMATED: 0 PER 100 WBC
PDW BLD-RTO: 12.7 % (ref 11.8–14.4)
PLATELET # BLD: 184 K/UL (ref 138–453)
PMV BLD AUTO: 9.5 FL (ref 8.1–13.5)
POTASSIUM SERPL-SCNC: 4.3 MMOL/L (ref 3.7–5.3)
RBC # BLD: 5.1 M/UL (ref 4.21–5.77)
SEG NEUTROPHILS: 62 % (ref 36–65)
SEGMENTED NEUTROPHILS ABSOLUTE COUNT: 2.19 K/UL (ref 1.5–8.1)
SODIUM BLD-SCNC: 140 MMOL/L (ref 135–144)
TOTAL PROTEIN: 7.6 G/DL (ref 6.4–8.3)
TRIGLYCERIDE, FASTING: 161 MG/DL
WBC # BLD: 3.5 K/UL (ref 3.5–11.3)

## 2022-03-24 PROCEDURE — 80053 COMPREHEN METABOLIC PANEL: CPT

## 2022-03-24 PROCEDURE — 80061 LIPID PANEL: CPT

## 2022-03-24 PROCEDURE — 36415 COLL VENOUS BLD VENIPUNCTURE: CPT

## 2022-03-24 PROCEDURE — 85025 COMPLETE CBC W/AUTO DIFF WBC: CPT

## 2022-03-24 PROCEDURE — 83036 HEMOGLOBIN GLYCOSYLATED A1C: CPT

## 2022-03-28 ENCOUNTER — OFFICE VISIT (OUTPATIENT)
Dept: FAMILY MEDICINE CLINIC | Age: 42
End: 2022-03-28
Payer: COMMERCIAL

## 2022-03-28 VITALS
SYSTOLIC BLOOD PRESSURE: 116 MMHG | HEIGHT: 74 IN | DIASTOLIC BLOOD PRESSURE: 70 MMHG | WEIGHT: 218 LBS | HEART RATE: 80 BPM | BODY MASS INDEX: 27.98 KG/M2

## 2022-03-28 DIAGNOSIS — M72.2 PLANTAR FASCIITIS, BILATERAL: ICD-10-CM

## 2022-03-28 DIAGNOSIS — R55 VASODEPRESSOR SYNCOPE: ICD-10-CM

## 2022-03-28 DIAGNOSIS — E78.5 HYPERLIPIDEMIA, UNSPECIFIED HYPERLIPIDEMIA TYPE: Primary | ICD-10-CM

## 2022-03-28 DIAGNOSIS — R73.01 IMPAIRED FASTING BLOOD SUGAR: ICD-10-CM

## 2022-03-28 PROCEDURE — 99213 OFFICE O/P EST LOW 20 MIN: CPT

## 2022-03-28 PROCEDURE — 99214 OFFICE O/P EST MOD 30 MIN: CPT | Performed by: FAMILY MEDICINE

## 2022-03-28 ASSESSMENT — PATIENT HEALTH QUESTIONNAIRE - PHQ9
SUM OF ALL RESPONSES TO PHQ9 QUESTIONS 1 & 2: 0
SUM OF ALL RESPONSES TO PHQ QUESTIONS 1-9: 0
1. LITTLE INTEREST OR PLEASURE IN DOING THINGS: 0
2. FEELING DOWN, DEPRESSED OR HOPELESS: 0
SUM OF ALL RESPONSES TO PHQ QUESTIONS 1-9: 0

## 2022-03-28 NOTE — PATIENT INSTRUCTIONS
Hospital Outpatient Visit on 03/24/2022   Component Date Value Ref Range Status    Hemoglobin A1C 03/24/2022 5.1  4.0 - 6.0 % Final    Estimated Avg Glucose 03/24/2022 100  mg/dL Final    Comment: The ADA and AACC recommend providing the estimated average glucose result to permit better   patient understanding of their HBA1c result.  Glucose 03/24/2022 108* 70 - 99 mg/dL Final    BUN 03/24/2022 17  6 - 20 mg/dL Final    CREATININE 03/24/2022 0.88  0.70 - 1.20 mg/dL Final    Bun/Cre Ratio 03/24/2022 19  9 - 20 Final    Calcium 03/24/2022 10.1  8.6 - 10.4 mg/dL Final    Sodium 03/24/2022 140  135 - 144 mmol/L Final    Potassium 03/24/2022 4.3  3.7 - 5.3 mmol/L Final    Chloride 03/24/2022 104  98 - 107 mmol/L Final    CO2 03/24/2022 27  20 - 31 mmol/L Final    Anion Gap 03/24/2022 9  9 - 17 mmol/L Final    Alkaline Phosphatase 03/24/2022 62  40 - 129 U/L Final    ALT 03/24/2022 23  5 - 41 U/L Final    AST 03/24/2022 22  <40 U/L Final    Total Bilirubin 03/24/2022 0.80  0.3 - 1.2 mg/dL Final    Total Protein 03/24/2022 7.6  6.4 - 8.3 g/dL Final    Albumin 03/24/2022 5.0  3.5 - 5.2 g/dL Final    Albumin/Globulin Ratio 03/24/2022 1.9  1.0 - 2.5 Final    GFR Non- 03/24/2022 >60  >60 mL/min Final    GFR  03/24/2022 >60  >60 mL/min Final    GFR Comment 03/24/2022        Final    Comment: Average GFR for 38-51 years old:   80 mL/min/1.73sq m  Chronic Kidney Disease:   <60 mL/min/1.73sq m  Kidney failure:   <15 mL/min/1.73sq m              eGFR calculated using average adult body mass.  Additional eGFR calculator available at:        [email or web address]            Cholesterol, Fasting 03/24/2022 153  <200 mg/dL Final    Comment:    Cholesterol Guidelines:      <200  Desirable   200-240  Borderline      >240  Undesirable         HDL 03/24/2022 35* >40 mg/dL Final    Comment:    HDL Guidelines:    <40     Undesirable   40-59    Borderline >59     Desirable         LDL Cholesterol 03/24/2022 86  0 - 130 mg/dL Final    Comment:    LDL Guidelines:     <100    Desirable   100-129   Near to/above Desirable   130-159   Borderline      >159   Undesirable     Direct (measured) LDL and calculated LDL are not interchangeable tests.  Chol/HDL Ratio 03/24/2022 4.4  <5 Final            Triglyceride, Fasting 03/24/2022 161* <150 mg/dL Final    Comment:    Triglyceride Guidelines:     <150   Desirable   150-199  Borderline   200-499  High     >499   Very high   Based on AHA Guidelines for fasting triglyceride, October 2012.          WBC 03/24/2022 3.5  3.5 - 11.3 k/uL Final    RBC 03/24/2022 5.10  4.21 - 5.77 m/uL Final    Hemoglobin 03/24/2022 16.1  13.0 - 17.0 g/dL Final    Hematocrit 03/24/2022 47.3  40.7 - 50.3 % Final    MCV 03/24/2022 92.7  82.6 - 102.9 fL Final    MCH 03/24/2022 31.6  25.2 - 33.5 pg Final    MCHC 03/24/2022 34.0* 25.2 - 33.5 g/dL Final    RDW 03/24/2022 12.7  11.8 - 14.4 % Final    Platelets 08/14/3302 184  138 - 453 k/uL Final    MPV 03/24/2022 9.5  8.1 - 13.5 fL Final    NRBC Automated 03/24/2022 0.0  0.0 per 100 WBC Final    Seg Neutrophils 03/24/2022 62  36 - 65 % Final    Lymphocytes 03/24/2022 27  24 - 43 % Final    Monocytes 03/24/2022 9  3 - 12 % Final    Eosinophils % 03/24/2022 2  1 - 4 % Final    Basophils 03/24/2022 0  0 - 2 % Final    Immature Granulocytes 03/24/2022 0  0 % Final    Segs Absolute 03/24/2022 2.19  1.50 - 8.10 k/uL Final    Absolute Lymph # 03/24/2022 0.94* 1.10 - 3.70 k/uL Final    Absolute Mono # 03/24/2022 0.31  0.10 - 1.20 k/uL Final    Absolute Eos # 03/24/2022 0.07  0.00 - 0.44 k/uL Final    Basophils Absolute 03/24/2022 <0.03  0.00 - 0.20 k/uL Final    Absolute Immature Granulocyte 03/24/2022 <0.03  0.00 - 0.30 k/uL Final     Hospital Outpatient Visit on 03/24/2022   Component Date Value Ref Range Status    Hemoglobin A1C 03/24/2022 5.1  4.0 - 6.0 % Final    Estimated Avg Glucose 03/24/2022 100  mg/dL Final    Comment: The ADA and AACC recommend providing the estimated average glucose result to permit better   patient understanding of their HBA1c result.  Glucose 03/24/2022 108* 70 - 99 mg/dL Final    BUN 03/24/2022 17  6 - 20 mg/dL Final    CREATININE 03/24/2022 0.88  0.70 - 1.20 mg/dL Final    Bun/Cre Ratio 03/24/2022 19  9 - 20 Final    Calcium 03/24/2022 10.1  8.6 - 10.4 mg/dL Final    Sodium 03/24/2022 140  135 - 144 mmol/L Final    Potassium 03/24/2022 4.3  3.7 - 5.3 mmol/L Final    Chloride 03/24/2022 104  98 - 107 mmol/L Final    CO2 03/24/2022 27  20 - 31 mmol/L Final    Anion Gap 03/24/2022 9  9 - 17 mmol/L Final    Alkaline Phosphatase 03/24/2022 62  40 - 129 U/L Final    ALT 03/24/2022 23  5 - 41 U/L Final    AST 03/24/2022 22  <40 U/L Final    Total Bilirubin 03/24/2022 0.80  0.3 - 1.2 mg/dL Final    Total Protein 03/24/2022 7.6  6.4 - 8.3 g/dL Final    Albumin 03/24/2022 5.0  3.5 - 5.2 g/dL Final    Albumin/Globulin Ratio 03/24/2022 1.9  1.0 - 2.5 Final    GFR Non- 03/24/2022 >60  >60 mL/min Final    GFR  03/24/2022 >60  >60 mL/min Final    GFR Comment 03/24/2022        Final    Comment: Average GFR for 38-51 years old:   80 mL/min/1.73sq m  Chronic Kidney Disease:   <60 mL/min/1.73sq m  Kidney failure:   <15 mL/min/1.73sq m              eGFR calculated using average adult body mass.  Additional eGFR calculator available at:        [email or web address]            Cholesterol, Fasting 03/24/2022 153  <200 mg/dL Final    Comment:    Cholesterol Guidelines:      <200  Desirable   200-240  Borderline      >240  Undesirable         HDL 03/24/2022 35* >40 mg/dL Final    Comment:    HDL Guidelines:    <40     Undesirable   40-59    Borderline    >59     Desirable         LDL Cholesterol 03/24/2022 86  0 - 130 mg/dL Final    Comment:    LDL Guidelines:     <100    Desirable   100-129   Near to/above Desirable   130-159   Borderline      >159   Undesirable     Direct (measured) LDL and calculated LDL are not interchangeable tests.  Chol/HDL Ratio 03/24/2022 4.4  <5 Final            Triglyceride, Fasting 03/24/2022 161* <150 mg/dL Final    Comment:    Triglyceride Guidelines:     <150   Desirable   150-199  Borderline   200-499  High     >499   Very high   Based on AHA Guidelines for fasting triglyceride, October 2012.          WBC 03/24/2022 3.5  3.5 - 11.3 k/uL Final    RBC 03/24/2022 5.10  4.21 - 5.77 m/uL Final    Hemoglobin 03/24/2022 16.1  13.0 - 17.0 g/dL Final    Hematocrit 03/24/2022 47.3  40.7 - 50.3 % Final    MCV 03/24/2022 92.7  82.6 - 102.9 fL Final    MCH 03/24/2022 31.6  25.2 - 33.5 pg Final    MCHC 03/24/2022 34.0* 25.2 - 33.5 g/dL Final    RDW 03/24/2022 12.7  11.8 - 14.4 % Final    Platelets 06/64/6797 184  138 - 453 k/uL Final    MPV 03/24/2022 9.5  8.1 - 13.5 fL Final    NRBC Automated 03/24/2022 0.0  0.0 per 100 WBC Final    Seg Neutrophils 03/24/2022 62  36 - 65 % Final    Lymphocytes 03/24/2022 27  24 - 43 % Final    Monocytes 03/24/2022 9  3 - 12 % Final    Eosinophils % 03/24/2022 2  1 - 4 % Final    Basophils 03/24/2022 0  0 - 2 % Final    Immature Granulocytes 03/24/2022 0  0 % Final    Segs Absolute 03/24/2022 2.19  1.50 - 8.10 k/uL Final    Absolute Lymph # 03/24/2022 0.94* 1.10 - 3.70 k/uL Final    Absolute Mono # 03/24/2022 0.31  0.10 - 1.20 k/uL Final    Absolute Eos # 03/24/2022 0.07  0.00 - 0.44 k/uL Final    Basophils Absolute 03/24/2022 <0.03  0.00 - 0.20 k/uL Final    Absolute Immature Granulocyte 03/24/2022 <0.03  0.00 - 0.30 k/uL Final

## 2022-03-28 NOTE — PROGRESS NOTES
Subjective:      Patient ID: Ghulam Calvo is a 39 y.o. male. Hyperlipidemia       Routine  follow up on chronic medical conditions, refills, and review of updated labs. I have reviewed the patient's medical history in detail and updated the computerized patient record. Feeling well at present. No interval or illness to report. 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 Heel pain still off and on but much better then in the past, manageable. Discussed heel cups, especially with work boots. He does restrict some activities like running that aggravate the issue. Overall improved. Tolerating statin. Compliant with meds without concerns for side effects. No dizziness or syncope concerns over the interval.        Past Medical History:   Diagnosis Date    Erectile dysfunction     Hyperlipidemia     Mild mitral valve prolapse     with mild regurgitation, asymptomatic.  Pectus excavatum     Prediabetes     Vasodepressor syncope     since 1999. History reviewed. No pertinent surgical history. Current Outpatient Medications   Medication Sig Dispense Refill    citalopram (CELEXA) 10 MG tablet TAKE 1 TABLET BY MOUTH ONCE DAILY 90 tablet 3    atorvastatin (LIPITOR) 10 MG tablet TAKE 1 TABLET BY MOUTH ONCE DAILY 90 tablet 3     No current facility-administered medications for this visit. No Known Allergies  Social History     Tobacco Use    Smoking status: Never Smoker    Smokeless tobacco: Never Used   Substance Use Topics    Alcohol use:  Yes     Alcohol/week: 0.0 standard drinks     Comment: occasional, less than 1 drink per day    Drug use: No     Family History   Problem Relation Age of Onset    Cancer Maternal Grandmother         bone    Colon Cancer Maternal Grandfather     Heart Attack Maternal Grandfather     Colon Cancer Paternal Grandmother     Heart Disease Paternal Grandfather     Heart Attack Father         2 or 3    Cancer Father         Waldenstrom's macroglobulinemia    Heart Disease Father         unknown type, 35s    Chronic Infections Maternal Cousin     Heart Disease Other     Hypertension Other          Review of Systems   Constitutional: Negative. HENT: Negative. Eyes: Negative. Respiratory: Negative. Cardiovascular: Negative. Gastrointestinal: Negative. Endocrine: Negative. Genitourinary: Negative. Musculoskeletal: Negative. Arthralgias: bilateral heel pain ongoing. Skin: Negative. Allergic/Immunologic: Negative. Neurological: Negative. Hematological: Negative. Psychiatric/Behavioral: Negative. Objective:   Physical Exam  Constitutional:       General: He is not in acute distress. Appearance: He is well-developed. HENT:      Head: Normocephalic and atraumatic. Right Ear: External ear normal.      Left Ear: External ear normal.      Mouth/Throat:      Pharynx: No oropharyngeal exudate. Eyes:      General: No scleral icterus. Conjunctiva/sclera: Conjunctivae normal.   Neck:      Thyroid: No thyromegaly. Cardiovascular:      Rate and Rhythm: Normal rate and regular rhythm. Heart sounds: Normal heart sounds. No murmur heard. Pulmonary:      Effort: Pulmonary effort is normal. No respiratory distress. Breath sounds: Normal breath sounds. No wheezing. Chest:      Chest wall: Deformity (pectus excavatum) present. Abdominal:      General: Bowel sounds are normal. There is no distension. Palpations: Abdomen is soft. Tenderness: There is no abdominal tenderness. There is no rebound. Musculoskeletal:         General: No tenderness. Normal range of motion. Cervical back: Neck supple. Skin:     General: Skin is warm and dry. Findings: No erythema or rash. Neurological:      Mental Status: He is alert and oriented to person, place, and time. Psychiatric:         Behavior: Behavior normal.         Thought Content:  Thought content normal.         Judgment: Judgment normal.       /70 (Site: Right Upper Arm, Position: Sitting, Cuff Size: Large Adult)   Pulse 80   Ht 6' 2\" (1.88 m)   Wt 218 lb (98.9 kg)   BMI 27.99 kg/m²   Hospital Outpatient Visit on 03/24/2022   Component Date Value Ref Range Status    Hemoglobin A1C 03/24/2022 5.1  4.0 - 6.0 % Final    Estimated Avg Glucose 03/24/2022 100  mg/dL Final    Glucose 03/24/2022 108* 70 - 99 mg/dL Final    BUN 03/24/2022 17  6 - 20 mg/dL Final    CREATININE 03/24/2022 0.88  0.70 - 1.20 mg/dL Final    Bun/Cre Ratio 03/24/2022 19  9 - 20 Final    Calcium 03/24/2022 10.1  8.6 - 10.4 mg/dL Final    Sodium 03/24/2022 140  135 - 144 mmol/L Final    Potassium 03/24/2022 4.3  3.7 - 5.3 mmol/L Final    Chloride 03/24/2022 104  98 - 107 mmol/L Final    CO2 03/24/2022 27  20 - 31 mmol/L Final    Anion Gap 03/24/2022 9  9 - 17 mmol/L Final    Alkaline Phosphatase 03/24/2022 62  40 - 129 U/L Final    ALT 03/24/2022 23  5 - 41 U/L Final    AST 03/24/2022 22  <40 U/L Final    Total Bilirubin 03/24/2022 0.80  0.3 - 1.2 mg/dL Final    Total Protein 03/24/2022 7.6  6.4 - 8.3 g/dL Final    Albumin 03/24/2022 5.0  3.5 - 5.2 g/dL Final    Albumin/Globulin Ratio 03/24/2022 1.9  1.0 - 2.5 Final    GFR Non- 03/24/2022 >60  >60 mL/min Final    GFR  03/24/2022 >60  >60 mL/min Final    GFR Comment 03/24/2022        Final    Cholesterol, Fasting 03/24/2022 153  <200 mg/dL Final    HDL 03/24/2022 35* >40 mg/dL Final    LDL Cholesterol 03/24/2022 86  0 - 130 mg/dL Final    Chol/HDL Ratio 03/24/2022 4.4  <5 Final    Triglyceride, Fasting 03/24/2022 161* <150 mg/dL Final    WBC 03/24/2022 3.5  3.5 - 11.3 k/uL Final    RBC 03/24/2022 5.10  4.21 - 5.77 m/uL Final    Hemoglobin 03/24/2022 16.1  13.0 - 17.0 g/dL Final    Hematocrit 03/24/2022 47.3  40.7 - 50.3 % Final    MCV 03/24/2022 92.7  82.6 - 102.9 fL Final    MCH 03/24/2022 31.6  25.2 - 33.5 pg Final    MCHC 03/24/2022 34.0* 25.2 - 33.5 g/dL Final    RDW 03/24/2022 12.7  11.8 - 14.4 % Final    Platelets 58/42/6625 184  138 - 453 k/uL Final    MPV 03/24/2022 9.5  8.1 - 13.5 fL Final    NRBC Automated 03/24/2022 0.0  0.0 per 100 WBC Final    Seg Neutrophils 03/24/2022 62  36 - 65 % Final    Lymphocytes 03/24/2022 27  24 - 43 % Final    Monocytes 03/24/2022 9  3 - 12 % Final    Eosinophils % 03/24/2022 2  1 - 4 % Final    Basophils 03/24/2022 0  0 - 2 % Final    Immature Granulocytes 03/24/2022 0  0 % Final    Segs Absolute 03/24/2022 2.19  1.50 - 8.10 k/uL Final    Absolute Lymph # 03/24/2022 0.94* 1.10 - 3.70 k/uL Final    Absolute Mono # 03/24/2022 0.31  0.10 - 1.20 k/uL Final    Absolute Eos # 03/24/2022 0.07  0.00 - 0.44 k/uL Final    Basophils Absolute 03/24/2022 <0.03  0.00 - 0.20 k/uL Final    Absolute Immature Granulocyte 03/24/2022 <0.03  0.00 - 0.30 k/uL Final       Assessment:       Encounter Diagnoses   Name Primary?  Hyperlipidemia, unspecified hyperlipidemia type Yes    Vasodepressor syncope     Plantar fasciitis, bilateral     Impaired fasting blood sugar              Plan:          Hyperlipidemia; improved/stable. Cont. lipitor    Syncope; quiescent over the last year. Plan to cont. celexa for now. Plantar fasciitis. Discussed heel cup trial.  Can inject if persistent or progressive. Ongoing but improved at present, going down to 8hr shifts has helped. More activity related exacerbations at present. Tolerable. Not using insoles/cups at present. Fairly mild/intermittent to gone at present. Minimal ifbs this check. Will follow trend. Stable /low at present. 100-108 . Watching sugar in the diet. Vaccinated against covid. Due for booster. Rec. Scheduling.

## 2022-08-24 ENCOUNTER — OFFICE VISIT (OUTPATIENT)
Dept: CARDIOLOGY | Age: 42
End: 2022-08-24
Payer: COMMERCIAL

## 2022-08-24 VITALS
HEIGHT: 74 IN | DIASTOLIC BLOOD PRESSURE: 84 MMHG | SYSTOLIC BLOOD PRESSURE: 110 MMHG | OXYGEN SATURATION: 99 % | WEIGHT: 220.25 LBS | BODY MASS INDEX: 28.27 KG/M2 | RESPIRATION RATE: 17 BRPM | HEART RATE: 56 BPM

## 2022-08-24 DIAGNOSIS — E78.5 HYPERLIPIDEMIA, UNSPECIFIED HYPERLIPIDEMIA TYPE: ICD-10-CM

## 2022-08-24 DIAGNOSIS — R55 VASODEPRESSOR SYNCOPE: Primary | ICD-10-CM

## 2022-08-24 PROCEDURE — 93000 ELECTROCARDIOGRAM COMPLETE: CPT | Performed by: INTERNAL MEDICINE

## 2022-08-24 PROCEDURE — 99214 OFFICE O/P EST MOD 30 MIN: CPT | Performed by: INTERNAL MEDICINE

## 2022-08-24 NOTE — PROGRESS NOTES
Today's Date: 8/24/2022  Patient's Name: Sarah Wen  Patient's age: 39 y.o., 1980    Subjective:  Sarah Wen is being seen in clinic today regarding   Chief Complaint   Patient presents with    Hyperlipidemia    Annual Exam   Vasodepressor syncope      he is doing well from a cardiac standpoint. Good functional capacity. No chest pain, no dyspnea, no PND, no syncope or pre-syncope, no orthopnea. Past Medical History:   has a past medical history of Erectile dysfunction, Hyperlipidemia, Mild mitral valve prolapse, Pectus excavatum, Prediabetes, and Vasodepressor syncope. Past Surgical History:   has no past surgical history on file. Home Medications:  Prior to Admission medications    Medication Sig Start Date End Date Taking? Authorizing Provider   atorvastatin (LIPITOR) 10 MG tablet TAKE 1 TABLET BY MOUTH ONCE DAILY 9/28/21  Yes Ml Brantley MD   citalopram (CELEXA) 10 MG tablet TAKE 1 TABLET BY MOUTH ONCE DAILY 9/28/21  Yes Ml Brantley MD       Allergies:  Patient has no known allergies. Social History:   reports that he has never smoked. He has never used smokeless tobacco. He reports current alcohol use. He reports that he does not use drugs. Family History: family history includes Cancer in his father and maternal grandmother; Chronic Infections in his maternal cousin; Colon Cancer in his maternal grandfather and paternal grandmother; Heart Attack in his father and maternal grandfather; Heart Disease in his father, paternal grandfather, and another family member; Hypertension in an other family member. No h/o sudden cardiac death. No for premature CAD    REVIEW OF SYSTEMS:    Constitutional: there has been no unanticipated weight loss. There's been No change in energy level, No change in activity level. Eyes: No visual changes or diplopia. No scleral icterus. ENT: No Headaches, hearing loss or vertigo. No mouth sores or sore throat.   Cardiovascular: see above  Respiratory: see above  Gastrointestinal: No abdominal pain, appetite loss, blood in stools. Genitourinary: No dysuria, trouble voiding, or hematuria. Musculoskeletal:  No gait disturbance, No weakness or joint complaints. Integumentary: No rash or pruritis. Neurological: No headache or diplopia. No tingling  Psychiatric: No anxiety, or depression. Endocrine: No temperature intolerance. Hematologic/Lymphatic: No abnormal bruising or bleeding, blood clots or swollen lymph nodes. Allergic/Immunologic: No nasal congestion or hives. PHYSICAL EXAM:      /84   Pulse 56   Resp 17   Ht 6' 2\" (1.88 m)   Wt 220 lb 4 oz (99.9 kg)   SpO2 99%   BMI 28.28 kg/m²    Constitutional and General Appearance: alert, cooperative, no distress and appears stated age  HEENT: PERRL, no cervical lymphadenopathy. No masses palpable. Normal oral mucosa  Respiratory:  Normal excursion and expansion without use of accessory muscles  Resp Auscultation: Good respiratory effort. No for increased work of breathing. On auscultation: clear to auscultation bilaterally  Cardiovascular:  Heart tones are crisp and normal. regular S1 and S2.  Jugular venous pulsation Normal  The carotid upstroke is normal in amplitude and contour without delay or bruit   Abdomen:   soft  Bowel sounds present  Extremities:   No edema  Neurological:  Alert and oriented. Cardiac Data:  EKG: Sinus bradycardia, LAE    Labs:     CBC: No results for input(s): WBC, HGB, HCT, PLT in the last 72 hours. BMP: No results for input(s): NA, K, CO2, BUN, CREATININE, LABGLOM, GLUCOSE in the last 72 hours. PT/INR: No results for input(s): PROTIME, INR in the last 72 hours. FASTING LIPID PANEL:  Lab Results   Component Value Date/Time    HDL 35 03/24/2022 10:59 AM    TRIG 139 09/10/2021 10:50 AM     LIVER PROFILE:No results for input(s): AST, ALT, LABALBU in the last 72 hours.     Problem List:  Patient Active Problem List   Diagnosis    Vasodepressor syncope    MVP (mitral valve prolapse)    Mild mitral valve prolapse    Hyperlipidemia        Assessment and plan:    -Vasodepressor syncope, +tilt table test, asymptomatic on celexa. Liberalize po fluid intake  -H/o asymptomatic bradycardia  -Overweight - encouraged diet, exercise, and discussed weight loss extensively. -Hyperlipidemia- LDL 86 on 03/24/22. Stable. Continue current dose of lipitor. -RTC 12 months.        [facility]  [phone]

## 2022-09-19 ENCOUNTER — HOSPITAL ENCOUNTER (OUTPATIENT)
Dept: LAB | Age: 42
Discharge: HOME | End: 2022-09-19
Payer: COMMERCIAL

## 2022-09-19 DIAGNOSIS — E78.5 HYPERLIPIDEMIA, UNSPECIFIED HYPERLIPIDEMIA TYPE: ICD-10-CM

## 2022-09-19 DIAGNOSIS — R73.01 IMPAIRED FASTING BLOOD SUGAR: ICD-10-CM

## 2022-09-19 LAB
ABSOLUTE EOS #: 0.08 K/UL (ref 0–0.44)
ABSOLUTE IMMATURE GRANULOCYTE: <0.03 K/UL (ref 0–0.3)
ABSOLUTE LYMPH #: 1.14 K/UL (ref 1.1–3.7)
ABSOLUTE MONO #: 0.24 K/UL (ref 0.1–1.2)
ALBUMIN SERPL-MCNC: 4.8 G/DL (ref 3.5–5.2)
ALBUMIN/GLOBULIN RATIO: 1.9 (ref 1–2.5)
ALP BLD-CCNC: 60 U/L (ref 40–129)
ALT SERPL-CCNC: 25 U/L (ref 5–41)
ANION GAP SERPL CALCULATED.3IONS-SCNC: 9 MMOL/L (ref 9–17)
AST SERPL-CCNC: 20 U/L
BASOPHILS # BLD: 1 % (ref 0–2)
BASOPHILS ABSOLUTE: <0.03 K/UL (ref 0–0.2)
BILIRUB SERPL-MCNC: 0.6 MG/DL (ref 0.3–1.2)
BUN BLDV-MCNC: 12 MG/DL (ref 6–20)
BUN/CREAT BLD: 14 (ref 9–20)
CALCIUM SERPL-MCNC: 9.8 MG/DL (ref 8.6–10.4)
CHLORIDE BLD-SCNC: 104 MMOL/L (ref 98–107)
CHOLESTEROL/HDL RATIO: 4.7
CHOLESTEROL: 161 MG/DL
CO2: 27 MMOL/L (ref 20–31)
CREAT SERPL-MCNC: 0.85 MG/DL (ref 0.7–1.2)
EOSINOPHILS RELATIVE PERCENT: 2 % (ref 1–4)
ESTIMATED AVERAGE GLUCOSE: 97 MG/DL
GFR AFRICAN AMERICAN: >60 ML/MIN
GFR NON-AFRICAN AMERICAN: >60 ML/MIN
GFR SERPL CREATININE-BSD FRML MDRD: ABNORMAL ML/MIN/{1.73_M2}
GLUCOSE BLD-MCNC: 100 MG/DL (ref 70–99)
HBA1C MFR BLD: 5 % (ref 4–6)
HCT VFR BLD CALC: 47.4 % (ref 40.7–50.3)
HDLC SERPL-MCNC: 34 MG/DL
HEMOGLOBIN: 16.2 G/DL (ref 13–17)
IMMATURE GRANULOCYTES: 1 %
LDL CHOLESTEROL: 99 MG/DL (ref 0–130)
LYMPHOCYTES # BLD: 27 % (ref 24–43)
MCH RBC QN AUTO: 31.5 PG (ref 25.2–33.5)
MCHC RBC AUTO-ENTMCNC: 34.2 G/DL (ref 25.2–33.5)
MCV RBC AUTO: 92.2 FL (ref 82.6–102.9)
MONOCYTES # BLD: 6 % (ref 3–12)
NRBC AUTOMATED: 0 PER 100 WBC
PDW BLD-RTO: 12.4 % (ref 11.8–14.4)
PLATELET # BLD: 184 K/UL (ref 138–453)
PMV BLD AUTO: 9.8 FL (ref 8.1–13.5)
POTASSIUM SERPL-SCNC: 4.3 MMOL/L (ref 3.7–5.3)
RBC # BLD: 5.14 M/UL (ref 4.21–5.77)
SEG NEUTROPHILS: 63 % (ref 36–65)
SEGMENTED NEUTROPHILS ABSOLUTE COUNT: 2.76 K/UL (ref 1.5–8.1)
SODIUM BLD-SCNC: 140 MMOL/L (ref 135–144)
TOTAL PROTEIN: 7.3 G/DL (ref 6.4–8.3)
TRIGL SERPL-MCNC: 142 MG/DL
WBC # BLD: 4.3 K/UL (ref 3.5–11.3)

## 2022-09-19 PROCEDURE — 36415 COLL VENOUS BLD VENIPUNCTURE: CPT

## 2022-09-19 PROCEDURE — 80053 COMPREHEN METABOLIC PANEL: CPT

## 2022-09-19 PROCEDURE — 83036 HEMOGLOBIN GLYCOSYLATED A1C: CPT

## 2022-09-19 PROCEDURE — 80061 LIPID PANEL: CPT

## 2022-09-19 PROCEDURE — 85025 COMPLETE CBC W/AUTO DIFF WBC: CPT

## 2022-09-29 ENCOUNTER — OFFICE VISIT (OUTPATIENT)
Dept: FAMILY MEDICINE CLINIC | Age: 42
End: 2022-09-29
Payer: COMMERCIAL

## 2022-09-29 VITALS
WEIGHT: 224 LBS | HEART RATE: 68 BPM | DIASTOLIC BLOOD PRESSURE: 64 MMHG | BODY MASS INDEX: 28.75 KG/M2 | HEIGHT: 74 IN | SYSTOLIC BLOOD PRESSURE: 118 MMHG

## 2022-09-29 DIAGNOSIS — R55 VASODEPRESSOR SYNCOPE: ICD-10-CM

## 2022-09-29 DIAGNOSIS — M72.2 PLANTAR FASCIITIS, BILATERAL: ICD-10-CM

## 2022-09-29 DIAGNOSIS — R73.01 IMPAIRED FASTING BLOOD SUGAR: ICD-10-CM

## 2022-09-29 DIAGNOSIS — E78.5 HYPERLIPIDEMIA, UNSPECIFIED HYPERLIPIDEMIA TYPE: Primary | ICD-10-CM

## 2022-09-29 PROCEDURE — 99214 OFFICE O/P EST MOD 30 MIN: CPT | Performed by: FAMILY MEDICINE

## 2022-09-29 RX ORDER — CITALOPRAM 10 MG/1
TABLET ORAL
Qty: 90 TABLET | Refills: 3 | Status: SHIPPED | OUTPATIENT
Start: 2022-09-29

## 2022-09-29 RX ORDER — ATORVASTATIN CALCIUM 10 MG/1
TABLET, FILM COATED ORAL
Qty: 90 TABLET | Refills: 3 | Status: SHIPPED | OUTPATIENT
Start: 2022-09-29

## 2022-09-29 SDOH — ECONOMIC STABILITY: FOOD INSECURITY: WITHIN THE PAST 12 MONTHS, THE FOOD YOU BOUGHT JUST DIDN'T LAST AND YOU DIDN'T HAVE MONEY TO GET MORE.: NEVER TRUE

## 2022-09-29 SDOH — ECONOMIC STABILITY: FOOD INSECURITY: WITHIN THE PAST 12 MONTHS, YOU WORRIED THAT YOUR FOOD WOULD RUN OUT BEFORE YOU GOT MONEY TO BUY MORE.: NEVER TRUE

## 2022-09-29 ASSESSMENT — ENCOUNTER SYMPTOMS
RESPIRATORY NEGATIVE: 1
EYES NEGATIVE: 1
GASTROINTESTINAL NEGATIVE: 1
ALLERGIC/IMMUNOLOGIC NEGATIVE: 1

## 2022-09-29 ASSESSMENT — PATIENT HEALTH QUESTIONNAIRE - PHQ9
SUM OF ALL RESPONSES TO PHQ QUESTIONS 1-9: 0
2. FEELING DOWN, DEPRESSED OR HOPELESS: 0
SUM OF ALL RESPONSES TO PHQ QUESTIONS 1-9: 0
1. LITTLE INTEREST OR PLEASURE IN DOING THINGS: 0
SUM OF ALL RESPONSES TO PHQ9 QUESTIONS 1 & 2: 0
SUM OF ALL RESPONSES TO PHQ QUESTIONS 1-9: 0
SUM OF ALL RESPONSES TO PHQ QUESTIONS 1-9: 0

## 2022-09-29 ASSESSMENT — SOCIAL DETERMINANTS OF HEALTH (SDOH): HOW HARD IS IT FOR YOU TO PAY FOR THE VERY BASICS LIKE FOOD, HOUSING, MEDICAL CARE, AND HEATING?: NOT HARD AT ALL

## 2022-09-29 NOTE — PATIENT INSTRUCTIONS
Hospital Outpatient Visit on 09/19/2022   Component Date Value Ref Range Status    Hemoglobin A1C 09/19/2022 5.0  4.0 - 6.0 % Final    Estimated Avg Glucose 09/19/2022 97  mg/dL Final    Comment: The ADA and AACC recommend providing the estimated average glucose result to permit better   patient understanding of their HBA1c result.       WBC 09/19/2022 4.3  3.5 - 11.3 k/uL Final    RBC 09/19/2022 5.14  4.21 - 5.77 m/uL Final    Hemoglobin 09/19/2022 16.2  13.0 - 17.0 g/dL Final    Hematocrit 09/19/2022 47.4  40.7 - 50.3 % Final    MCV 09/19/2022 92.2  82.6 - 102.9 fL Final    MCH 09/19/2022 31.5  25.2 - 33.5 pg Final    MCHC 09/19/2022 34.2 (A)  25.2 - 33.5 g/dL Final    RDW 09/19/2022 12.4  11.8 - 14.4 % Final    Platelets 08/16/2145 184  138 - 453 k/uL Final    MPV 09/19/2022 9.8  8.1 - 13.5 fL Final    NRBC Automated 09/19/2022 0.0  0.0 per 100 WBC Final    Seg Neutrophils 09/19/2022 63  36 - 65 % Final    Lymphocytes 09/19/2022 27  24 - 43 % Final    Monocytes 09/19/2022 6  3 - 12 % Final    Eosinophils % 09/19/2022 2  1 - 4 % Final    Basophils 09/19/2022 1  0 - 2 % Final    Immature Granulocytes 09/19/2022 1 (A)  0 % Final    Segs Absolute 09/19/2022 2.76  1.50 - 8.10 k/uL Final    Absolute Lymph # 09/19/2022 1.14  1.10 - 3.70 k/uL Final    Absolute Mono # 09/19/2022 0.24  0.10 - 1.20 k/uL Final    Absolute Eos # 09/19/2022 0.08  0.00 - 0.44 k/uL Final    Basophils Absolute 09/19/2022 <0.03  0.00 - 0.20 k/uL Final    Absolute Immature Granulocyte 09/19/2022 <0.03  0.00 - 0.30 k/uL Final    Glucose 09/19/2022 100 (A)  70 - 99 mg/dL Final    BUN 09/19/2022 12  6 - 20 mg/dL Final    Creatinine 09/19/2022 0.85  0.70 - 1.20 mg/dL Final    Bun/Cre Ratio 09/19/2022 14  9 - 20 Final    Calcium 09/19/2022 9.8  8.6 - 10.4 mg/dL Final    Sodium 09/19/2022 140  135 - 144 mmol/L Final    Potassium 09/19/2022 4.3  3.7 - 5.3 mmol/L Final    Chloride 09/19/2022 104  98 - 107 mmol/L Final    CO2 09/19/2022 27  20 - 31 mmol/L Final    Anion Gap 09/19/2022 9  9 - 17 mmol/L Final    Alkaline Phosphatase 09/19/2022 60  40 - 129 U/L Final    ALT 09/19/2022 25  5 - 41 U/L Final    AST 09/19/2022 20  <40 U/L Final    Total Bilirubin 09/19/2022 0.6  0.3 - 1.2 mg/dL Final    Total Protein 09/19/2022 7.3  6.4 - 8.3 g/dL Final    Albumin 09/19/2022 4.8  3.5 - 5.2 g/dL Final    Albumin/Globulin Ratio 09/19/2022 1.9  1.0 - 2.5 Final    GFR Non- 09/19/2022 >60  >60 mL/min Final    GFR  09/19/2022 >60  >60 mL/min Final    GFR Comment 09/19/2022        Final    Comment: Average GFR for 38-51 years old:   80 mL/min/1.73sq m  Chronic Kidney Disease:   <60 mL/min/1.73sq m  Kidney failure:   <15 mL/min/1.73sq m              eGFR calculated using average adult body mass. Additional eGFR calculator available at:        [employer]            Cholesterol 09/19/2022 161  <200 mg/dL Final    Comment:    Cholesterol Guidelines:      <200  Desirable   200-240  Borderline      >240  Undesirable         HDL 09/19/2022 34 (A)  >40 mg/dL Final    Comment:    HDL Guidelines:    <40     Undesirable   40-59    Borderline    >59     Desirable         LDL Cholesterol 09/19/2022 99  0 - 130 mg/dL Final    Comment:    LDL Guidelines:     <100    Desirable   100-129   Near to/above Desirable   130-159   Borderline      >159   Undesirable     Direct (measured) LDL and calculated LDL are not interchangeable tests. Chol/HDL Ratio 09/19/2022 4.7  <5 Final            Triglycerides 09/19/2022 142  <150 mg/dL Final    Comment:    Triglyceride Guidelines:     <150   Desirable   150-199  Borderline   200-499  High     >499   Very high   Based on AHA Guidelines for fasting triglyceride, October 2012.

## 2022-09-29 NOTE — PROGRESS NOTES
Subjective:      Patient ID: Claudia Patton is a 39 y.o. male. Hyperlipidemia    Foot Pain      Routine  follow up on chronic medical conditions, refills, and review of updated labs. I have reviewed the patient's medical history in detail and updated the computerized patient record. Feeling well at present. No interval or illness to report. 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 Heel pain still off and on but much better then in the past, manageable. Discussed heel cups, especially with work boots. He does restrict some activities like running that aggravate the issue. Overall improved. Tolerating statin. Compliant with meds without concerns for side effects. No dizziness or syncope concerns over the interval.        Past Medical History:   Diagnosis Date    Erectile dysfunction     Hyperlipidemia     Mild mitral valve prolapse     with mild regurgitation, asymptomatic. Pectus excavatum     Prediabetes     Vasodepressor syncope     since 1999. History reviewed. No pertinent surgical history. Current Outpatient Medications   Medication Sig Dispense Refill    atorvastatin (LIPITOR) 10 MG tablet TAKE 1 TABLET BY MOUTH ONCE DAILY 90 tablet 3    citalopram (CELEXA) 10 MG tablet TAKE 1 TABLET BY MOUTH ONCE DAILY 90 tablet 3     No current facility-administered medications for this visit. No Known Allergies  Social History     Tobacco Use    Smoking status: Never    Smokeless tobacco: Never   Substance Use Topics    Alcohol use:  Yes     Alcohol/week: 0.0 standard drinks     Comment: occasional, less than 1 drink per day    Drug use: No     Family History   Problem Relation Age of Onset    Cancer Maternal Grandmother         bone    Colon Cancer Maternal Grandfather     Heart Attack Maternal Grandfather     Colon Cancer Paternal Grandmother     Heart Disease Paternal Grandfather     Heart Attack Father         2 or 3    Cancer Father Waldenstrom's macroglobulinemia    Heart Disease Father         unknown type, 35s    Chronic Infections Maternal Cousin     Heart Disease Other     Hypertension Other          Review of Systems   Constitutional: Negative. HENT: Negative. Eyes: Negative. Respiratory: Negative. Cardiovascular: Negative. Gastrointestinal: Negative. Endocrine: Negative. Genitourinary: Negative. Musculoskeletal: Negative. Arthralgias: bilateral heel pain ongoing. Skin: Negative. Allergic/Immunologic: Negative. Neurological: Negative. Hematological: Negative. Psychiatric/Behavioral: Negative. Objective:   Physical Exam  Constitutional:       General: He is not in acute distress. Appearance: He is well-developed. HENT:      Head: Normocephalic and atraumatic. Right Ear: External ear normal.      Left Ear: External ear normal.      Mouth/Throat:      Pharynx: No oropharyngeal exudate. Eyes:      General: No scleral icterus. Conjunctiva/sclera: Conjunctivae normal.   Neck:      Thyroid: No thyromegaly. Cardiovascular:      Rate and Rhythm: Normal rate and regular rhythm. Heart sounds: Normal heart sounds. No murmur heard. Pulmonary:      Effort: Pulmonary effort is normal. No respiratory distress. Breath sounds: Normal breath sounds. No wheezing. Chest:      Chest wall: Deformity (pectus excavatum) present. Abdominal:      General: Bowel sounds are normal. There is no distension. Palpations: Abdomen is soft. Tenderness: There is no abdominal tenderness. There is no rebound. Musculoskeletal:         General: No tenderness. Normal range of motion. Cervical back: Neck supple. Skin:     General: Skin is warm and dry. Findings: No erythema or rash. Neurological:      Mental Status: He is alert and oriented to person, place, and time. Psychiatric:         Behavior: Behavior normal.         Thought Content:  Thought content normal. Judgment: Judgment normal.     /64 (Site: Right Upper Arm, Position: Sitting, Cuff Size: Large Adult)   Pulse 68   Ht 6' 2\" (1.88 m)   Wt 224 lb (101.6 kg)   BMI 28.76 kg/m²   Hospital Outpatient Visit on 09/19/2022   Component Date Value Ref Range Status    Hemoglobin A1C 09/19/2022 5.0  4.0 - 6.0 % Final    Estimated Avg Glucose 09/19/2022 97  mg/dL Final    WBC 09/19/2022 4.3  3.5 - 11.3 k/uL Final    RBC 09/19/2022 5.14  4.21 - 5.77 m/uL Final    Hemoglobin 09/19/2022 16.2  13.0 - 17.0 g/dL Final    Hematocrit 09/19/2022 47.4  40.7 - 50.3 % Final    MCV 09/19/2022 92.2  82.6 - 102.9 fL Final    MCH 09/19/2022 31.5  25.2 - 33.5 pg Final    MCHC 09/19/2022 34.2 (A)  25.2 - 33.5 g/dL Final    RDW 09/19/2022 12.4  11.8 - 14.4 % Final    Platelets 53/62/7416 184  138 - 453 k/uL Final    MPV 09/19/2022 9.8  8.1 - 13.5 fL Final    NRBC Automated 09/19/2022 0.0  0.0 per 100 WBC Final    Seg Neutrophils 09/19/2022 63  36 - 65 % Final    Lymphocytes 09/19/2022 27  24 - 43 % Final    Monocytes 09/19/2022 6  3 - 12 % Final    Eosinophils % 09/19/2022 2  1 - 4 % Final    Basophils 09/19/2022 1  0 - 2 % Final    Immature Granulocytes 09/19/2022 1 (A)  0 % Final    Segs Absolute 09/19/2022 2.76  1.50 - 8.10 k/uL Final    Absolute Lymph # 09/19/2022 1.14  1.10 - 3.70 k/uL Final    Absolute Mono # 09/19/2022 0.24  0.10 - 1.20 k/uL Final    Absolute Eos # 09/19/2022 0.08  0.00 - 0.44 k/uL Final    Basophils Absolute 09/19/2022 <0.03  0.00 - 0.20 k/uL Final    Absolute Immature Granulocyte 09/19/2022 <0.03  0.00 - 0.30 k/uL Final    Glucose 09/19/2022 100 (A)  70 - 99 mg/dL Final    BUN 09/19/2022 12  6 - 20 mg/dL Final    Creatinine 09/19/2022 0.85  0.70 - 1.20 mg/dL Final    Bun/Cre Ratio 09/19/2022 14  9 - 20 Final    Calcium 09/19/2022 9.8  8.6 - 10.4 mg/dL Final    Sodium 09/19/2022 140  135 - 144 mmol/L Final    Potassium 09/19/2022 4.3  3.7 - 5.3 mmol/L Final    Chloride 09/19/2022 104  98 - 107 mmol/L Final CO2 09/19/2022 27  20 - 31 mmol/L Final    Anion Gap 09/19/2022 9  9 - 17 mmol/L Final    Alkaline Phosphatase 09/19/2022 60  40 - 129 U/L Final    ALT 09/19/2022 25  5 - 41 U/L Final    AST 09/19/2022 20  <40 U/L Final    Total Bilirubin 09/19/2022 0.6  0.3 - 1.2 mg/dL Final    Total Protein 09/19/2022 7.3  6.4 - 8.3 g/dL Final    Albumin 09/19/2022 4.8  3.5 - 5.2 g/dL Final    Albumin/Globulin Ratio 09/19/2022 1.9  1.0 - 2.5 Final    GFR Non- 09/19/2022 >60  >60 mL/min Final    GFR  09/19/2022 >60  >60 mL/min Final    GFR Comment 09/19/2022        Final    Cholesterol 09/19/2022 161  <200 mg/dL Final    HDL 09/19/2022 34 (A)  >40 mg/dL Final    LDL Cholesterol 09/19/2022 99  0 - 130 mg/dL Final    Chol/HDL Ratio 09/19/2022 4.7  <5 Final    Triglycerides 09/19/2022 142  <150 mg/dL Final       Assessment:       Encounter Diagnoses   Name Primary? Hyperlipidemia, unspecified hyperlipidemia type Yes    Vasodepressor syncope     Plantar fasciitis, bilateral     Impaired fasting blood sugar          Plan:          Hyperlipidemia; improved/stable. Cont. lipitor    Syncope; quiescent over the last year. Plan to cont. celexa for now. Plantar fasciitis. Discussed heel cup trial.  Can inject if persistent or progressive. Ongoing but improved at present, going down to 8hr shifts has helped. More activity related exacerbations at present. Tolerable. Not using insoles/cups at present. Fairly mild/intermittent to gone at present. Minimal ifbs on prior check. Will follow trend. Stable /low at present. 100-108 . Watching sugar in the diet. A1c at 5%    Vaccinated against covid. Rec. Flu vaccine. Rec.  Updated tdap

## 2022-11-23 RX ORDER — ATORVASTATIN CALCIUM 10 MG/1
TABLET, FILM COATED ORAL
Qty: 90 TABLET | Refills: 0 | OUTPATIENT
Start: 2022-11-23

## 2022-11-23 RX ORDER — CITALOPRAM 10 MG/1
TABLET ORAL
Qty: 90 TABLET | Refills: 0 | OUTPATIENT
Start: 2022-11-23

## 2023-03-22 ENCOUNTER — HOSPITAL ENCOUNTER (OUTPATIENT)
Age: 43
Discharge: HOME | End: 2023-03-22
Payer: COMMERCIAL

## 2023-03-22 DIAGNOSIS — E78.5 HYPERLIPIDEMIA, UNSPECIFIED HYPERLIPIDEMIA TYPE: ICD-10-CM

## 2023-03-22 DIAGNOSIS — R73.01 IMPAIRED FASTING BLOOD SUGAR: ICD-10-CM

## 2023-03-22 LAB
ABSOLUTE EOS #: 0.06 K/UL (ref 0–0.44)
ABSOLUTE IMMATURE GRANULOCYTE: <0.03 K/UL (ref 0–0.3)
ABSOLUTE LYMPH #: 1.16 K/UL (ref 1.1–3.7)
ABSOLUTE MONO #: 0.29 K/UL (ref 0.1–1.2)
ALBUMIN SERPL-MCNC: 4.7 G/DL (ref 3.5–5.2)
ALBUMIN/GLOBULIN RATIO: 1.9 (ref 1–2.5)
ALP SERPL-CCNC: 60 U/L (ref 40–129)
ALT SERPL-CCNC: 23 U/L (ref 5–41)
ANION GAP SERPL CALCULATED.3IONS-SCNC: 10 MMOL/L (ref 9–17)
AST SERPL-CCNC: 23 U/L
BASOPHILS # BLD: 1 % (ref 0–2)
BASOPHILS ABSOLUTE: <0.03 K/UL (ref 0–0.2)
BILIRUB SERPL-MCNC: 0.9 MG/DL (ref 0.3–1.2)
BUN SERPL-MCNC: 12 MG/DL (ref 6–20)
BUN/CREAT BLD: 14 (ref 9–20)
CALCIUM SERPL-MCNC: 10.2 MG/DL (ref 8.6–10.4)
CHLORIDE SERPL-SCNC: 106 MMOL/L (ref 98–107)
CHOLEST SERPL-MCNC: 151 MG/DL
CHOLESTEROL/HDL RATIO: 4.7
CO2 SERPL-SCNC: 26 MMOL/L (ref 20–31)
CREAT SERPL-MCNC: 0.87 MG/DL (ref 0.7–1.2)
EOSINOPHILS RELATIVE PERCENT: 2 % (ref 1–4)
EST. AVERAGE GLUCOSE BLD GHB EST-MCNC: 100 MG/DL
GFR SERPL CREATININE-BSD FRML MDRD: >60 ML/MIN/1.73M2
GLUCOSE SERPL-MCNC: 98 MG/DL (ref 70–99)
HBA1C MFR BLD: 5.1 % (ref 4–6)
HCT VFR BLD AUTO: 46.5 % (ref 40.7–50.3)
HDLC SERPL-MCNC: 32 MG/DL
HGB BLD-MCNC: 15.7 G/DL (ref 13–17)
IMMATURE GRANULOCYTES: 0 %
LDLC SERPL CALC-MCNC: 85 MG/DL (ref 0–130)
LYMPHOCYTES # BLD: 30 % (ref 24–43)
MCH RBC QN AUTO: 31.3 PG (ref 25.2–33.5)
MCHC RBC AUTO-ENTMCNC: 33.8 G/DL (ref 25.2–33.5)
MCV RBC AUTO: 92.6 FL (ref 82.6–102.9)
MONOCYTES # BLD: 7 % (ref 3–12)
NRBC AUTOMATED: 0 PER 100 WBC
PDW BLD-RTO: 12.7 % (ref 11.8–14.4)
PLATELET # BLD AUTO: 190 K/UL (ref 138–453)
PMV BLD AUTO: 9.7 FL (ref 8.1–13.5)
POTASSIUM SERPL-SCNC: 4.2 MMOL/L (ref 3.7–5.3)
PROT SERPL-MCNC: 7.2 G/DL (ref 6.4–8.3)
RBC # BLD: 5.02 M/UL (ref 4.21–5.77)
SEG NEUTROPHILS: 60 % (ref 36–65)
SEGMENTED NEUTROPHILS ABSOLUTE COUNT: 2.37 K/UL (ref 1.5–8.1)
SODIUM SERPL-SCNC: 142 MMOL/L (ref 135–144)
TRIGL SERPL-MCNC: 169 MG/DL
WBC # BLD AUTO: 3.9 K/UL (ref 3.5–11.3)

## 2023-03-22 PROCEDURE — 80061 LIPID PANEL: CPT

## 2023-03-22 PROCEDURE — 36415 COLL VENOUS BLD VENIPUNCTURE: CPT

## 2023-03-22 PROCEDURE — 80053 COMPREHEN METABOLIC PANEL: CPT

## 2023-03-22 PROCEDURE — 83036 HEMOGLOBIN GLYCOSYLATED A1C: CPT

## 2023-03-22 PROCEDURE — 85025 COMPLETE CBC W/AUTO DIFF WBC: CPT

## 2023-03-29 ENCOUNTER — OFFICE VISIT (OUTPATIENT)
Dept: FAMILY MEDICINE CLINIC | Age: 43
End: 2023-03-29
Payer: COMMERCIAL

## 2023-03-29 VITALS
HEART RATE: 68 BPM | HEIGHT: 74 IN | DIASTOLIC BLOOD PRESSURE: 68 MMHG | BODY MASS INDEX: 28.75 KG/M2 | WEIGHT: 224 LBS | SYSTOLIC BLOOD PRESSURE: 118 MMHG

## 2023-03-29 DIAGNOSIS — R73.01 IMPAIRED FASTING BLOOD SUGAR: ICD-10-CM

## 2023-03-29 DIAGNOSIS — E78.5 HYPERLIPIDEMIA, UNSPECIFIED HYPERLIPIDEMIA TYPE: Primary | ICD-10-CM

## 2023-03-29 DIAGNOSIS — M72.2 PLANTAR FASCIITIS, BILATERAL: ICD-10-CM

## 2023-03-29 DIAGNOSIS — R55 VASODEPRESSOR SYNCOPE: ICD-10-CM

## 2023-03-29 PROCEDURE — 99214 OFFICE O/P EST MOD 30 MIN: CPT | Performed by: FAMILY MEDICINE

## 2023-03-29 SDOH — ECONOMIC STABILITY: FOOD INSECURITY: WITHIN THE PAST 12 MONTHS, YOU WORRIED THAT YOUR FOOD WOULD RUN OUT BEFORE YOU GOT MONEY TO BUY MORE.: NEVER TRUE

## 2023-03-29 SDOH — ECONOMIC STABILITY: FOOD INSECURITY: WITHIN THE PAST 12 MONTHS, THE FOOD YOU BOUGHT JUST DIDN'T LAST AND YOU DIDN'T HAVE MONEY TO GET MORE.: NEVER TRUE

## 2023-03-29 SDOH — ECONOMIC STABILITY: INCOME INSECURITY: HOW HARD IS IT FOR YOU TO PAY FOR THE VERY BASICS LIKE FOOD, HOUSING, MEDICAL CARE, AND HEATING?: NOT HARD AT ALL

## 2023-03-29 SDOH — ECONOMIC STABILITY: HOUSING INSECURITY
IN THE LAST 12 MONTHS, WAS THERE A TIME WHEN YOU DID NOT HAVE A STEADY PLACE TO SLEEP OR SLEPT IN A SHELTER (INCLUDING NOW)?: NO

## 2023-03-29 ASSESSMENT — PATIENT HEALTH QUESTIONNAIRE - PHQ9
SUM OF ALL RESPONSES TO PHQ QUESTIONS 1-9: 0
SUM OF ALL RESPONSES TO PHQ9 QUESTIONS 1 & 2: 0
2. FEELING DOWN, DEPRESSED OR HOPELESS: 0
SUM OF ALL RESPONSES TO PHQ QUESTIONS 1-9: 0
SUM OF ALL RESPONSES TO PHQ QUESTIONS 1-9: 0
1. LITTLE INTEREST OR PLEASURE IN DOING THINGS: 0
SUM OF ALL RESPONSES TO PHQ QUESTIONS 1-9: 0

## 2023-03-29 NOTE — PATIENT INSTRUCTIONS
Hospital Outpatient Visit on 03/22/2023   Component Date Value Ref Range Status    Hemoglobin A1C 03/22/2023 5.1  4.0 - 6.0 % Final    Estimated Avg Glucose 03/22/2023 100  mg/dL Final    Comment: The ADA and AACC recommend providing the estimated average glucose result to permit better   patient understanding of their HBA1c result. WBC 03/22/2023 3.9  3.5 - 11.3 k/uL Final    RBC 03/22/2023 5.02  4.21 - 5.77 m/uL Final    Hemoglobin 03/22/2023 15.7  13.0 - 17.0 g/dL Final    Hematocrit 03/22/2023 46.5  40.7 - 50.3 % Final    MCV 03/22/2023 92.6  82.6 - 102.9 fL Final    MCH 03/22/2023 31.3  25.2 - 33.5 pg Final    MCHC 03/22/2023 33.8 (A)  25.2 - 33.5 g/dL Final    RDW 03/22/2023 12.7  11.8 - 14.4 % Final    Platelets 81/39/7913 190  138 - 453 k/uL Final    MPV 03/22/2023 9.7  8.1 - 13.5 fL Final    NRBC Automated 03/22/2023 0.0  0.0 per 100 WBC Final    Seg Neutrophils 03/22/2023 60  36 - 65 % Final    Lymphocytes 03/22/2023 30  24 - 43 % Final    Monocytes 03/22/2023 7  3 - 12 % Final    Eosinophils % 03/22/2023 2  1 - 4 % Final    Basophils 03/22/2023 1  0 - 2 % Final    Immature Granulocytes 03/22/2023 0  0 % Final    Segs Absolute 03/22/2023 2.37  1.50 - 8.10 k/uL Final    Absolute Lymph # 03/22/2023 1.16  1.10 - 3.70 k/uL Final    Absolute Mono # 03/22/2023 0.29  0.10 - 1.20 k/uL Final    Absolute Eos # 03/22/2023 0.06  0.00 - 0.44 k/uL Final    Basophils Absolute 03/22/2023 <0.03  0.00 - 0.20 k/uL Final    Absolute Immature Granulocyte 03/22/2023 <0.03  0.00 - 0.30 k/uL Final    Glucose 03/22/2023 98  70 - 99 mg/dL Final    BUN 03/22/2023 12  6 - 20 mg/dL Final    Creatinine 03/22/2023 0.87  0.70 - 1.20 mg/dL Final    Est, Glom Filt Rate 03/22/2023 >60  >60 mL/min/1.73m2 Final    Comment:       These results are not intended for use in patients <25years of age. eGFR results are calculated without a race factor using the 2021 CKD-EPI equation.   Careful clinical correlation is recommended,

## 2023-03-29 NOTE — PROGRESS NOTES
Subjective:      Patient ID: Maldonado Lovell is a 43 y.o. male. Hyperlipidemia    Foot Pain      Routine  follow up on chronic medical conditions, refills, and review of updated labs. I have reviewed the patient's medical history in detail and updated the computerized patient record. Feeling well at present. No interval or illness to report. 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 Heel pain still off and on but much better then in the past, manageable. Discussed heel cups, especially with work boots. He does restrict some activities like running that aggravate the issue. Overall improved. Mild/nagging by report. Tolerating statin. Compliant with meds without concerns for side effects. No dizziness or syncope concerns over the interval.        Past Medical History:   Diagnosis Date    Erectile dysfunction     Hyperlipidemia     Mild mitral valve prolapse     with mild regurgitation, asymptomatic. Pectus excavatum     Prediabetes     Vasodepressor syncope     since 1999. History reviewed. No pertinent surgical history. Current Outpatient Medications   Medication Sig Dispense Refill    atorvastatin (LIPITOR) 10 MG tablet TAKE 1 TABLET BY MOUTH ONCE DAILY 90 tablet 3    citalopram (CELEXA) 10 MG tablet TAKE 1 TABLET BY MOUTH ONCE DAILY 90 tablet 3     No current facility-administered medications for this visit. No Known Allergies  Social History     Tobacco Use    Smoking status: Never    Smokeless tobacco: Never   Substance Use Topics    Alcohol use:  Yes     Alcohol/week: 0.0 standard drinks     Comment: occasional, less than 1 drink per day    Drug use: No     Family History   Problem Relation Age of Onset    Cancer Maternal Grandmother         bone    Colon Cancer Maternal Grandfather     Heart Attack Maternal Grandfather     Colon Cancer Paternal Grandmother     Heart Disease Paternal Grandfather     Heart Attack Father         2 or 3

## 2023-05-08 ENCOUNTER — TELEPHONE (OUTPATIENT)
Dept: CARDIOLOGY | Age: 43
End: 2023-05-08

## 2023-05-08 NOTE — TELEPHONE ENCOUNTER
Pt called stating he has dental work coming up and he needs to know if he still needs to have an antibiotic.    Please advise    [phone]    Last Appt:  8/24/2022  Next Appt:   8/30/2023  Med verified in Epic

## 2023-09-19 ENCOUNTER — HOSPITAL ENCOUNTER (OUTPATIENT)
Age: 43
Discharge: HOME | End: 2023-09-19
Payer: COMMERCIAL

## 2023-09-19 DIAGNOSIS — E78.5 HYPERLIPIDEMIA, UNSPECIFIED HYPERLIPIDEMIA TYPE: ICD-10-CM

## 2023-09-19 DIAGNOSIS — R73.01 IMPAIRED FASTING BLOOD SUGAR: ICD-10-CM

## 2023-09-19 LAB
ALBUMIN SERPL-MCNC: 4.9 G/DL (ref 3.5–5.2)
ALBUMIN/GLOB SERPL: 2.1 {RATIO} (ref 1–2.5)
ALP SERPL-CCNC: 62 U/L (ref 40–129)
ALT SERPL-CCNC: 23 U/L (ref 5–41)
ANION GAP SERPL CALCULATED.3IONS-SCNC: 10 MMOL/L (ref 9–17)
AST SERPL-CCNC: 24 U/L
BASOPHILS # BLD: <0.03 K/UL (ref 0–0.2)
BASOPHILS NFR BLD: 0 % (ref 0–2)
BILIRUB SERPL-MCNC: 0.7 MG/DL (ref 0.3–1.2)
BUN SERPL-MCNC: 15 MG/DL (ref 6–20)
BUN/CREAT SERPL: 17 (ref 9–20)
CALCIUM SERPL-MCNC: 9.8 MG/DL (ref 8.6–10.4)
CHLORIDE SERPL-SCNC: 102 MMOL/L (ref 98–107)
CHOLEST SERPL-MCNC: 151 MG/DL
CHOLESTEROL/HDL RATIO: 4.6
CO2 SERPL-SCNC: 27 MMOL/L (ref 20–31)
CREAT SERPL-MCNC: 0.9 MG/DL (ref 0.7–1.2)
EOSINOPHIL # BLD: 0.1 K/UL (ref 0–0.44)
EOSINOPHILS RELATIVE PERCENT: 3 % (ref 1–4)
ERYTHROCYTE [DISTWIDTH] IN BLOOD BY AUTOMATED COUNT: 12.3 % (ref 11.8–14.4)
GFR SERPL CREATININE-BSD FRML MDRD: >60 ML/MIN/1.73M2
GLUCOSE SERPL-MCNC: 90 MG/DL (ref 70–99)
HCT VFR BLD AUTO: 44.7 % (ref 40.7–50.3)
HDLC SERPL-MCNC: 33 MG/DL
HGB BLD-MCNC: 15.2 G/DL (ref 13–17)
IMM GRANULOCYTES # BLD AUTO: <0.03 K/UL (ref 0–0.3)
IMM GRANULOCYTES NFR BLD: 0 %
LDLC SERPL CALC-MCNC: 89 MG/DL (ref 0–130)
LYMPHOCYTES NFR BLD: 1.32 K/UL (ref 1.1–3.7)
LYMPHOCYTES RELATIVE PERCENT: 33 % (ref 24–43)
MCH RBC QN AUTO: 31.3 PG (ref 25.2–33.5)
MCHC RBC AUTO-ENTMCNC: 34 G/DL (ref 25.2–33.5)
MCV RBC AUTO: 92.2 FL (ref 82.6–102.9)
MONOCYTES NFR BLD: 0.29 K/UL (ref 0.1–1.2)
MONOCYTES NFR BLD: 7 % (ref 3–12)
NEUTROPHILS NFR BLD: 57 % (ref 36–65)
NEUTS SEG NFR BLD: 2.22 K/UL (ref 1.5–8.1)
NRBC BLD-RTO: 0 PER 100 WBC
PLATELET # BLD AUTO: 189 K/UL (ref 138–453)
PMV BLD AUTO: 9.7 FL (ref 8.1–13.5)
POTASSIUM SERPL-SCNC: 3.8 MMOL/L (ref 3.7–5.3)
PROT SERPL-MCNC: 7.2 G/DL (ref 6.4–8.3)
RBC # BLD AUTO: 4.85 M/UL (ref 4.21–5.77)
SODIUM SERPL-SCNC: 139 MMOL/L (ref 135–144)
TRIGL SERPL-MCNC: 143 MG/DL
WBC OTHER # BLD: 4 K/UL (ref 3.5–11.3)

## 2023-09-19 PROCEDURE — 80061 LIPID PANEL: CPT

## 2023-09-19 PROCEDURE — 36415 COLL VENOUS BLD VENIPUNCTURE: CPT

## 2023-09-19 PROCEDURE — 80053 COMPREHEN METABOLIC PANEL: CPT

## 2023-09-19 PROCEDURE — 83036 HEMOGLOBIN GLYCOSYLATED A1C: CPT

## 2023-09-19 PROCEDURE — 85025 COMPLETE CBC W/AUTO DIFF WBC: CPT

## 2023-09-20 LAB
EST. AVERAGE GLUCOSE BLD GHB EST-MCNC: 100 MG/DL
HBA1C MFR BLD: 5.1 % (ref 4–6)

## 2023-09-29 ENCOUNTER — OFFICE VISIT (OUTPATIENT)
Dept: FAMILY MEDICINE CLINIC | Age: 43
End: 2023-09-29
Payer: COMMERCIAL

## 2023-09-29 VITALS
WEIGHT: 221 LBS | HEART RATE: 56 BPM | OXYGEN SATURATION: 98 % | BODY MASS INDEX: 28.36 KG/M2 | SYSTOLIC BLOOD PRESSURE: 110 MMHG | DIASTOLIC BLOOD PRESSURE: 70 MMHG | HEIGHT: 74 IN

## 2023-09-29 DIAGNOSIS — E78.5 HYPERLIPIDEMIA, UNSPECIFIED HYPERLIPIDEMIA TYPE: Primary | ICD-10-CM

## 2023-09-29 DIAGNOSIS — R73.01 IMPAIRED FASTING BLOOD SUGAR: ICD-10-CM

## 2023-09-29 DIAGNOSIS — M72.2 PLANTAR FASCIITIS, BILATERAL: ICD-10-CM

## 2023-09-29 DIAGNOSIS — R55 VASODEPRESSOR SYNCOPE: ICD-10-CM

## 2023-09-29 PROCEDURE — 99214 OFFICE O/P EST MOD 30 MIN: CPT | Performed by: FAMILY MEDICINE

## 2023-09-29 ASSESSMENT — ENCOUNTER SYMPTOMS
ALLERGIC/IMMUNOLOGIC NEGATIVE: 1
EYES NEGATIVE: 1
RESPIRATORY NEGATIVE: 1
GASTROINTESTINAL NEGATIVE: 1

## 2023-09-29 NOTE — PROGRESS NOTES
Colon Cancer Paternal Grandmother     Heart Disease Paternal Grandfather     Heart Attack Father         2 or 3    Cancer Father         Waldenstrom's macroglobulinemia    Heart Disease Father         unknown type, 35s    Chronic Infections Maternal Cousin     Heart Disease Other     Hypertension Other          Review of Systems   Constitutional: Negative. HENT: Negative. Eyes: Negative. Respiratory: Negative. Cardiovascular: Negative. Gastrointestinal: Negative. Endocrine: Negative. Genitourinary: Negative. Musculoskeletal: Negative. Arthralgias: bilateral heel pain ongoing. Skin: Negative. Allergic/Immunologic: Negative. Neurological: Negative. Hematological: Negative. Psychiatric/Behavioral: Negative. Objective:   Physical Exam  Constitutional:       General: He is not in acute distress. Appearance: He is well-developed. HENT:      Head: Normocephalic and atraumatic. Right Ear: External ear normal.      Left Ear: External ear normal.      Mouth/Throat:      Pharynx: No oropharyngeal exudate. Eyes:      General: No scleral icterus. Conjunctiva/sclera: Conjunctivae normal.   Neck:      Thyroid: No thyromegaly. Cardiovascular:      Rate and Rhythm: Normal rate and regular rhythm. Heart sounds: Normal heart sounds. No murmur heard. Pulmonary:      Effort: Pulmonary effort is normal. No respiratory distress. Breath sounds: Normal breath sounds. No wheezing. Chest:      Chest wall: Deformity (pectus excavatum) present. Abdominal:      General: Bowel sounds are normal. There is no distension. Palpations: Abdomen is soft. Tenderness: There is no abdominal tenderness. There is no rebound. Musculoskeletal:         General: No tenderness. Normal range of motion. Cervical back: Neck supple. Skin:     General: Skin is warm and dry. Findings: No erythema or rash.    Neurological:      Mental Status: He is alert and

## 2023-09-29 NOTE — PATIENT INSTRUCTIONS
Hospital Outpatient Visit on 09/19/2023   Component Date Value Ref Range Status    Hemoglobin A1C 09/19/2023 5.1  4.0 - 6.0 % Final    Estimated Avg Glucose 09/19/2023 100  mg/dL Final    Comment: The ADA and AACC recommend providing the estimated average glucose result to permit better   patient understanding of their HBA1c result.       WBC 09/19/2023 4.0  3.5 - 11.3 k/uL Final    RBC 09/19/2023 4.85  4.21 - 5.77 m/uL Final    Hemoglobin 09/19/2023 15.2  13.0 - 17.0 g/dL Final    Hematocrit 09/19/2023 44.7  40.7 - 50.3 % Final    MCV 09/19/2023 92.2  82.6 - 102.9 fL Final    MCH 09/19/2023 31.3  25.2 - 33.5 pg Final    MCHC 09/19/2023 34.0 (H)  25.2 - 33.5 g/dL Final    RDW 09/19/2023 12.3  11.8 - 14.4 % Final    Platelets 90/65/3900 189  138 - 453 k/uL Final    MPV 09/19/2023 9.7  8.1 - 13.5 fL Final    NRBC Automated 09/19/2023 0.0  0.0 per 100 WBC Final    Neutrophils % 09/19/2023 57  36 - 65 % Final    Lymphocytes % 09/19/2023 33  24 - 43 % Final    Monocytes % 09/19/2023 7  3 - 12 % Final    Eosinophils % 09/19/2023 3  1 - 4 % Final    Basophils % 09/19/2023 0  0 - 2 % Final    Immature Granulocytes 09/19/2023 0  0 % Final    Neutrophils Absolute 09/19/2023 2.22  1.50 - 8.10 k/uL Final    Lymphocytes Absolute 09/19/2023 1.32  1.10 - 3.70 k/uL Final    Monocytes Absolute 09/19/2023 0.29  0.10 - 1.20 k/uL Final    Eosinophils Absolute 09/19/2023 0.10  0.00 - 0.44 k/uL Final    Basophils Absolute 09/19/2023 <0.03  0.00 - 0.20 k/uL Final    Absolute Immature Granulocyte 09/19/2023 <0.03  0.00 - 0.30 k/uL Final    Sodium 09/19/2023 139  135 - 144 mmol/L Final    Potassium 09/19/2023 3.8  3.7 - 5.3 mmol/L Final    Chloride 09/19/2023 102  98 - 107 mmol/L Final    CO2 09/19/2023 27  20 - 31 mmol/L Final    Anion Gap 09/19/2023 10  9 - 17 mmol/L Final    Glucose 09/19/2023 90  70 - 99 mg/dL Final    BUN 09/19/2023 15  6 - 20 mg/dL Final    Creatinine 09/19/2023 0.9  0.7 - 1.2 mg/dL Final    Est, Glom Filt Rate

## 2023-11-08 ENCOUNTER — OFFICE VISIT (OUTPATIENT)
Dept: CARDIOLOGY | Age: 43
End: 2023-11-08
Payer: COMMERCIAL

## 2023-11-08 VITALS
HEIGHT: 74 IN | BODY MASS INDEX: 28.11 KG/M2 | HEART RATE: 55 BPM | WEIGHT: 219 LBS | DIASTOLIC BLOOD PRESSURE: 82 MMHG | SYSTOLIC BLOOD PRESSURE: 124 MMHG

## 2023-11-08 DIAGNOSIS — E78.5 HYPERLIPIDEMIA, UNSPECIFIED HYPERLIPIDEMIA TYPE: Primary | ICD-10-CM

## 2023-11-08 DIAGNOSIS — R55 VASODEPRESSOR SYNCOPE: ICD-10-CM

## 2023-11-08 PROCEDURE — 99214 OFFICE O/P EST MOD 30 MIN: CPT | Performed by: INTERNAL MEDICINE

## 2023-11-08 PROCEDURE — 93000 ELECTROCARDIOGRAM COMPLETE: CPT | Performed by: INTERNAL MEDICINE

## 2024-01-23 RX ORDER — CITALOPRAM HYDROBROMIDE 10 MG/1
TABLET ORAL
Qty: 90 TABLET | Refills: 3 | Status: SHIPPED | OUTPATIENT
Start: 2024-01-23

## 2024-01-23 RX ORDER — ATORVASTATIN CALCIUM 10 MG/1
TABLET, FILM COATED ORAL
Qty: 90 TABLET | Refills: 3 | Status: SHIPPED | OUTPATIENT
Start: 2024-01-23

## 2024-01-23 NOTE — TELEPHONE ENCOUNTER
Henri called requesting a refill of the below medication which has been pended for you:     Requested Prescriptions     Pending Prescriptions Disp Refills    citalopram (CELEXA) 10 MG tablet [Pharmacy Med Name: Citalopram Hydrobromide 10 MG Oral Tablet] 90 tablet 3     Sig: Take 1 tablet by mouth once daily    atorvastatin (LIPITOR) 10 MG tablet [Pharmacy Med Name: Atorvastatin Calcium 10 MG Oral Tablet] 90 tablet 3     Sig: Take 1 tablet by mouth once daily       Last Appointment Date: 9/29/2023  Next Appointment Date: 3/29/2024    No Known Allergies

## 2024-03-21 ENCOUNTER — HOSPITAL ENCOUNTER (OUTPATIENT)
Age: 44
Discharge: HOME | End: 2024-03-21
Payer: COMMERCIAL

## 2024-03-21 DIAGNOSIS — E78.5 HYPERLIPIDEMIA, UNSPECIFIED HYPERLIPIDEMIA TYPE: ICD-10-CM

## 2024-03-21 DIAGNOSIS — R73.01 IMPAIRED FASTING BLOOD SUGAR: ICD-10-CM

## 2024-03-21 LAB
ALBUMIN SERPL-MCNC: 4.7 G/DL (ref 3.5–5.2)
ALBUMIN/GLOB SERPL: 1.8 {RATIO} (ref 1–2.5)
ALP SERPL-CCNC: 64 U/L (ref 40–129)
ALT SERPL-CCNC: 26 U/L (ref 5–41)
ANION GAP SERPL CALCULATED.3IONS-SCNC: 7 MMOL/L (ref 9–17)
AST SERPL-CCNC: 24 U/L
BASOPHILS # BLD: <0.03 K/UL (ref 0–0.2)
BASOPHILS NFR BLD: 1 % (ref 0–2)
BILIRUB SERPL-MCNC: 0.7 MG/DL (ref 0.3–1.2)
BUN SERPL-MCNC: 15 MG/DL (ref 6–20)
BUN/CREAT SERPL: 19 (ref 9–20)
CALCIUM SERPL-MCNC: 9.5 MG/DL (ref 8.6–10.4)
CHLORIDE SERPL-SCNC: 103 MMOL/L (ref 98–107)
CHOLEST SERPL-MCNC: 153 MG/DL (ref 0–199)
CHOLESTEROL/HDL RATIO: 5
CO2 SERPL-SCNC: 28 MMOL/L (ref 20–31)
CREAT SERPL-MCNC: 0.8 MG/DL (ref 0.7–1.2)
EOSINOPHIL # BLD: 0.08 K/UL (ref 0–0.44)
EOSINOPHILS RELATIVE PERCENT: 2 % (ref 1–4)
ERYTHROCYTE [DISTWIDTH] IN BLOOD BY AUTOMATED COUNT: 12.6 % (ref 11.8–14.4)
EST. AVERAGE GLUCOSE BLD GHB EST-MCNC: 94 MG/DL
GFR SERPL CREATININE-BSD FRML MDRD: >60 ML/MIN/1.73M2
GLUCOSE SERPL-MCNC: 90 MG/DL (ref 70–99)
HBA1C MFR BLD: 4.9 % (ref 4–6)
HCT VFR BLD AUTO: 45 % (ref 40.7–50.3)
HDLC SERPL-MCNC: 33 MG/DL
HGB BLD-MCNC: 15.4 G/DL (ref 13–17)
IMM GRANULOCYTES # BLD AUTO: <0.03 K/UL (ref 0–0.3)
IMM GRANULOCYTES NFR BLD: 1 %
LDLC SERPL CALC-MCNC: 94 MG/DL (ref 0–100)
LYMPHOCYTES NFR BLD: 1.29 K/UL (ref 1.1–3.7)
LYMPHOCYTES RELATIVE PERCENT: 31 % (ref 24–43)
MCH RBC QN AUTO: 31.4 PG (ref 25.2–33.5)
MCHC RBC AUTO-ENTMCNC: 34.2 G/DL (ref 25.2–33.5)
MCV RBC AUTO: 91.6 FL (ref 82.6–102.9)
MONOCYTES NFR BLD: 0.28 K/UL (ref 0.1–1.2)
MONOCYTES NFR BLD: 7 % (ref 3–12)
NEUTROPHILS NFR BLD: 58 % (ref 36–65)
NEUTS SEG NFR BLD: 2.5 K/UL (ref 1.5–8.1)
NRBC BLD-RTO: 0 PER 100 WBC
PLATELET # BLD AUTO: 194 K/UL (ref 138–453)
PMV BLD AUTO: 9.8 FL (ref 8.1–13.5)
POTASSIUM SERPL-SCNC: 4.2 MMOL/L (ref 3.7–5.3)
PROT SERPL-MCNC: 7.3 G/DL (ref 6.4–8.3)
RBC # BLD AUTO: 4.91 M/UL (ref 4.21–5.77)
SODIUM SERPL-SCNC: 138 MMOL/L (ref 135–144)
TRIGL SERPL-MCNC: 132 MG/DL
VLDLC SERPL CALC-MCNC: 26 MG/DL
WBC OTHER # BLD: 4.2 K/UL (ref 3.5–11.3)

## 2024-03-21 PROCEDURE — 80053 COMPREHEN METABOLIC PANEL: CPT

## 2024-03-21 PROCEDURE — 85025 COMPLETE CBC W/AUTO DIFF WBC: CPT

## 2024-03-21 PROCEDURE — 83036 HEMOGLOBIN GLYCOSYLATED A1C: CPT

## 2024-03-21 PROCEDURE — 36415 COLL VENOUS BLD VENIPUNCTURE: CPT

## 2024-03-21 PROCEDURE — 80061 LIPID PANEL: CPT

## 2024-03-29 ENCOUNTER — OFFICE VISIT (OUTPATIENT)
Dept: FAMILY MEDICINE CLINIC | Age: 44
End: 2024-03-29

## 2024-03-29 VITALS
BODY MASS INDEX: 28.99 KG/M2 | WEIGHT: 225.8 LBS | SYSTOLIC BLOOD PRESSURE: 123 MMHG | OXYGEN SATURATION: 99 % | HEART RATE: 54 BPM | DIASTOLIC BLOOD PRESSURE: 82 MMHG

## 2024-03-29 DIAGNOSIS — R55 VASODEPRESSOR SYNCOPE: ICD-10-CM

## 2024-03-29 DIAGNOSIS — L57.0 ACTINIC KERATOSIS: ICD-10-CM

## 2024-03-29 DIAGNOSIS — E78.5 HYPERLIPIDEMIA, UNSPECIFIED HYPERLIPIDEMIA TYPE: ICD-10-CM

## 2024-03-29 DIAGNOSIS — L98.9 NON-HEALING SKIN LESION OF NOSE: ICD-10-CM

## 2024-03-29 DIAGNOSIS — M72.2 PLANTAR FASCIITIS, BILATERAL: ICD-10-CM

## 2024-03-29 DIAGNOSIS — R73.01 IMPAIRED FASTING BLOOD SUGAR: Primary | ICD-10-CM

## 2024-03-29 SDOH — ECONOMIC STABILITY: FOOD INSECURITY: WITHIN THE PAST 12 MONTHS, THE FOOD YOU BOUGHT JUST DIDN'T LAST AND YOU DIDN'T HAVE MONEY TO GET MORE.: NEVER TRUE

## 2024-03-29 SDOH — ECONOMIC STABILITY: INCOME INSECURITY: HOW HARD IS IT FOR YOU TO PAY FOR THE VERY BASICS LIKE FOOD, HOUSING, MEDICAL CARE, AND HEATING?: NOT HARD AT ALL

## 2024-03-29 SDOH — ECONOMIC STABILITY: FOOD INSECURITY: WITHIN THE PAST 12 MONTHS, YOU WORRIED THAT YOUR FOOD WOULD RUN OUT BEFORE YOU GOT MONEY TO BUY MORE.: NEVER TRUE

## 2024-03-29 ASSESSMENT — PATIENT HEALTH QUESTIONNAIRE - PHQ9
SUM OF ALL RESPONSES TO PHQ9 QUESTIONS 1 & 2: 0
1. LITTLE INTEREST OR PLEASURE IN DOING THINGS: NOT AT ALL
SUM OF ALL RESPONSES TO PHQ QUESTIONS 1-9: 0
2. FEELING DOWN, DEPRESSED OR HOPELESS: NOT AT ALL
SUM OF ALL RESPONSES TO PHQ QUESTIONS 1-9: 0

## 2024-03-29 ASSESSMENT — ENCOUNTER SYMPTOMS
ALLERGIC/IMMUNOLOGIC NEGATIVE: 1
BACK PAIN: 1
GASTROINTESTINAL NEGATIVE: 1
RESPIRATORY NEGATIVE: 1
EYES NEGATIVE: 1

## 2024-03-29 NOTE — PROGRESS NOTES
Subjective:      Patient ID: Henri Moore is a 43 y.o. male.    Hyperlipidemia    Foot Pain     Back Pain       Routine  follow up on chronic medical conditions, refills, and review of updated labs.  I have reviewed the patient's medical history in detail and updated the computerized patient record.   Feeling well at present. No interval or illness to report. 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  Heel pain still off and on but much better then in the past, manageable.  Discussed heel cups, especially with work boots.   He does restrict some activities like running that aggravate the issue.  Overall improved.  Mild/nagging by report.  Intermittent pain at present , left >right.    Tolerating statin. Compliant with meds without concerns for side effects.    No dizziness or syncope concerns over the interval.    Past Medical History:   Diagnosis Date    Erectile dysfunction     Hyperlipidemia     Mild mitral valve prolapse     with mild regurgitation, asymptomatic.     Pectus excavatum     Prediabetes     Vasodepressor syncope     since 1999.     History reviewed. No pertinent surgical history.  Current Outpatient Medications   Medication Sig Dispense Refill    citalopram (CELEXA) 10 MG tablet Take 1 tablet by mouth once daily 90 tablet 3    atorvastatin (LIPITOR) 10 MG tablet Take 1 tablet by mouth once daily 90 tablet 3     No current facility-administered medications for this visit.     No Known Allergies  Social History     Tobacco Use    Smoking status: Never    Smokeless tobacco: Never   Substance Use Topics    Alcohol use: Yes     Alcohol/week: 0.0 standard drinks of alcohol     Comment: occasional, less than 1 drink per day    Drug use: No     Family History   Problem Relation Age of Onset    Cancer Maternal Grandmother         bone    Colon Cancer Maternal Grandfather     Heart Attack Maternal Grandfather     Colon Cancer

## 2024-03-29 NOTE — PATIENT INSTRUCTIONS
Hospital Outpatient Visit on 03/21/2024   Component Date Value Ref Range Status    Hemoglobin A1C 03/21/2024 4.9  4.0 - 6.0 % Final    Estimated Avg Glucose 03/21/2024 94  mg/dL Final    Comment: The ADA and AACC recommend providing the estimated average glucose result to permit better   patient understanding of their HBA1c result.      Cholesterol 03/21/2024 153  0 - 199 mg/dL Final    Comment:    Cholesterol Guidelines:      <200  Desirable   200-240  Borderline      >240  Undesirable         HDL 03/21/2024 33 (L)  >40 mg/dL Final    Comment:    HDL Guidelines:    <40     Undesirable   40-59    Borderline    >59     Desirable         LDL Cholesterol 03/21/2024 94  0 - 100 mg/dL Final    Comment:    LDL Guidelines:     <100    Desirable   100-129   Near to/above Desirable   130-159   Borderline      >159   Undesirable     Direct (measured) LDL and calculated LDL are not interchangeable tests.      Chol/HDL Ratio 03/21/2024 5.0   Final    Triglycerides 03/21/2024 132  <150 mg/dL Final    Comment:    Triglyceride Guidelines:     <150   Desirable   150-199  Borderline   200-499  High     >499   Very high   Based on AHA Guidelines for fasting triglyceride, October 2012.         VLDL 03/21/2024 26  mg/dL Final    Sodium 03/21/2024 138  135 - 144 mmol/L Final    Potassium 03/21/2024 4.2  3.7 - 5.3 mmol/L Final    Chloride 03/21/2024 103  98 - 107 mmol/L Final    CO2 03/21/2024 28  20 - 31 mmol/L Final    Anion Gap 03/21/2024 7 (L)  9 - 17 mmol/L Final    Glucose 03/21/2024 90  70 - 99 mg/dL Final    BUN 03/21/2024 15  6 - 20 mg/dL Final    Creatinine 03/21/2024 0.8  0.7 - 1.2 mg/dL Final    Est, Glom Filt Rate 03/21/2024 >60  >60 mL/min/1.73m2 Final    Comment:       These results are not intended for use in patients <18 years of age.        eGFR results are calculated without a race factor using the 2021 CKD-EPI equation.  Careful clinical correlation is recommended, particularly when comparing to results   calculated

## 2024-09-25 ENCOUNTER — HOSPITAL ENCOUNTER (OUTPATIENT)
Age: 44
Discharge: HOME | End: 2024-09-25
Payer: COMMERCIAL

## 2024-09-25 DIAGNOSIS — R73.01 IMPAIRED FASTING BLOOD SUGAR: ICD-10-CM

## 2024-09-25 DIAGNOSIS — E78.5 HYPERLIPIDEMIA, UNSPECIFIED HYPERLIPIDEMIA TYPE: ICD-10-CM

## 2024-09-25 LAB
ALBUMIN SERPL-MCNC: 4.5 G/DL (ref 3.5–5.2)
ALBUMIN/GLOB SERPL: 1.9 {RATIO} (ref 1–2.5)
ALP SERPL-CCNC: 60 U/L (ref 40–129)
ALT SERPL-CCNC: 29 U/L (ref 5–41)
ANION GAP SERPL CALCULATED.3IONS-SCNC: 8 MMOL/L (ref 9–17)
AST SERPL-CCNC: 22 U/L
BASOPHILS # BLD: <0.03 K/UL (ref 0–0.2)
BASOPHILS NFR BLD: 1 % (ref 0–2)
BILIRUB SERPL-MCNC: 0.6 MG/DL (ref 0.3–1.2)
BUN SERPL-MCNC: 15 MG/DL (ref 6–20)
BUN/CREAT SERPL: 15 (ref 9–20)
CALCIUM SERPL-MCNC: 9.6 MG/DL (ref 8.6–10.4)
CHLORIDE SERPL-SCNC: 104 MMOL/L (ref 98–107)
CHOLEST SERPL-MCNC: 156 MG/DL (ref 0–199)
CHOLESTEROL/HDL RATIO: 5
CO2 SERPL-SCNC: 27 MMOL/L (ref 20–31)
CREAT SERPL-MCNC: 1 MG/DL (ref 0.7–1.2)
EOSINOPHIL # BLD: 0.1 K/UL (ref 0–0.44)
EOSINOPHILS RELATIVE PERCENT: 2 % (ref 1–4)
ERYTHROCYTE [DISTWIDTH] IN BLOOD BY AUTOMATED COUNT: 12.5 % (ref 11.8–14.4)
GFR, ESTIMATED: >90 ML/MIN/1.73M2
GLUCOSE SERPL-MCNC: 93 MG/DL (ref 70–99)
HCT VFR BLD AUTO: 43.9 % (ref 40.7–50.3)
HDLC SERPL-MCNC: 30 MG/DL
HGB BLD-MCNC: 14.9 G/DL (ref 13–17)
IMM GRANULOCYTES # BLD AUTO: <0.03 K/UL (ref 0–0.3)
IMM GRANULOCYTES NFR BLD: 0 %
LDLC SERPL CALC-MCNC: 89 MG/DL (ref 0–100)
LYMPHOCYTES NFR BLD: 1.46 K/UL (ref 1.1–3.7)
LYMPHOCYTES RELATIVE PERCENT: 35 % (ref 24–43)
MCH RBC QN AUTO: 31.2 PG (ref 25.2–33.5)
MCHC RBC AUTO-ENTMCNC: 33.9 G/DL (ref 25.2–33.5)
MCV RBC AUTO: 91.8 FL (ref 82.6–102.9)
MONOCYTES NFR BLD: 0.37 K/UL (ref 0.1–1.2)
MONOCYTES NFR BLD: 9 % (ref 3–12)
NEUTROPHILS NFR BLD: 53 % (ref 36–65)
NEUTS SEG NFR BLD: 2.22 K/UL (ref 1.5–8.1)
NRBC BLD-RTO: 0 PER 100 WBC
PLATELET # BLD AUTO: 167 K/UL (ref 138–453)
PMV BLD AUTO: 9.6 FL (ref 8.1–13.5)
POTASSIUM SERPL-SCNC: 3.8 MMOL/L (ref 3.7–5.3)
PROT SERPL-MCNC: 6.9 G/DL (ref 6.4–8.3)
RBC # BLD AUTO: 4.78 M/UL (ref 4.21–5.77)
SODIUM SERPL-SCNC: 139 MMOL/L (ref 135–144)
TRIGL SERPL-MCNC: 185 MG/DL
VLDLC SERPL CALC-MCNC: 37 MG/DL
WBC OTHER # BLD: 4.2 K/UL (ref 3.5–11.3)

## 2024-09-25 PROCEDURE — 80061 LIPID PANEL: CPT

## 2024-09-25 PROCEDURE — 80053 COMPREHEN METABOLIC PANEL: CPT

## 2024-09-25 PROCEDURE — 85025 COMPLETE CBC W/AUTO DIFF WBC: CPT

## 2024-09-25 PROCEDURE — 83036 HEMOGLOBIN GLYCOSYLATED A1C: CPT

## 2024-09-25 PROCEDURE — 36415 COLL VENOUS BLD VENIPUNCTURE: CPT

## 2024-09-26 LAB
EST. AVERAGE GLUCOSE BLD GHB EST-MCNC: 97 MG/DL
HBA1C MFR BLD: 5 % (ref 4–6)

## 2024-10-01 ENCOUNTER — OFFICE VISIT (OUTPATIENT)
Dept: FAMILY MEDICINE CLINIC | Age: 44
End: 2024-10-01
Payer: COMMERCIAL

## 2024-10-01 VITALS
WEIGHT: 227 LBS | SYSTOLIC BLOOD PRESSURE: 116 MMHG | OXYGEN SATURATION: 97 % | HEART RATE: 67 BPM | BODY MASS INDEX: 29.13 KG/M2 | DIASTOLIC BLOOD PRESSURE: 72 MMHG | HEIGHT: 74 IN

## 2024-10-01 DIAGNOSIS — R55 VASODEPRESSOR SYNCOPE: ICD-10-CM

## 2024-10-01 DIAGNOSIS — M72.2 PLANTAR FASCIITIS, BILATERAL: ICD-10-CM

## 2024-10-01 DIAGNOSIS — R73.01 IMPAIRED FASTING BLOOD SUGAR: ICD-10-CM

## 2024-10-01 DIAGNOSIS — E78.5 HYPERLIPIDEMIA, UNSPECIFIED HYPERLIPIDEMIA TYPE: Primary | ICD-10-CM

## 2024-10-01 PROCEDURE — 99214 OFFICE O/P EST MOD 30 MIN: CPT | Performed by: FAMILY MEDICINE

## 2024-10-01 ASSESSMENT — ENCOUNTER SYMPTOMS
RESPIRATORY NEGATIVE: 1
BACK PAIN: 1
GASTROINTESTINAL NEGATIVE: 1
ALLERGIC/IMMUNOLOGIC NEGATIVE: 1
EYES NEGATIVE: 1

## 2024-10-01 NOTE — PROGRESS NOTES
Subjective:      Patient ID: Henri Moore is a 43 y.o. male.    Hyperlipidemia    Foot Pain     Back Pain       Routine  follow up on chronic medical conditions, refills, and review of updated labs.  I have reviewed the patient's medical history in detail and updated the computerized patient record.   Feeling well at present. No interval or illness to report. 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  Heel pain still off and on but much better then in the past, manageable.  Discussed heel cups, especially with work boots.   He does restrict some activities like running that aggravate the issue.  Overall improved.  Mild/nagging by report.  Intermittent pain at present , left >right.    Tolerating statin. Compliant with meds without concerns for side effects.     Single episode of dizziness after standing up.  No syncope.    Past Medical History:   Diagnosis Date    Erectile dysfunction     Hyperlipidemia     Mild mitral valve prolapse     with mild regurgitation, asymptomatic.     Pectus excavatum     Prediabetes     Vasodepressor syncope     since 1999.     History reviewed. No pertinent surgical history.  Current Outpatient Medications   Medication Sig Dispense Refill    citalopram (CELEXA) 10 MG tablet Take 1 tablet by mouth once daily 90 tablet 3    atorvastatin (LIPITOR) 10 MG tablet Take 1 tablet by mouth once daily 90 tablet 3     No current facility-administered medications for this visit.     No Known Allergies  Social History     Tobacco Use    Smoking status: Never    Smokeless tobacco: Never   Substance Use Topics    Alcohol use: Yes     Alcohol/week: 0.0 standard drinks of alcohol     Comment: occasional, less than 1 drink per day    Drug use: No     Family History   Problem Relation Age of Onset    Cancer Maternal Grandmother         bone    Colon Cancer Maternal Grandfather     Heart Attack Maternal Grandfather     Colon

## 2024-10-01 NOTE — PATIENT INSTRUCTIONS
Hospital Outpatient Visit on 09/25/2024   Component Date Value Ref Range Status    WBC 09/25/2024 4.2  3.5 - 11.3 k/uL Final    RBC 09/25/2024 4.78  4.21 - 5.77 m/uL Final    Hemoglobin 09/25/2024 14.9  13.0 - 17.0 g/dL Final    Hematocrit 09/25/2024 43.9  40.7 - 50.3 % Final    MCV 09/25/2024 91.8  82.6 - 102.9 fL Final    MCH 09/25/2024 31.2  25.2 - 33.5 pg Final    MCHC 09/25/2024 33.9 (H)  25.2 - 33.5 g/dL Final    RDW 09/25/2024 12.5  11.8 - 14.4 % Final    Platelets 09/25/2024 167  138 - 453 k/uL Final    MPV 09/25/2024 9.6  8.1 - 13.5 fL Final    NRBC Automated 09/25/2024 0.0  0.0 per 100 WBC Final    Neutrophils % 09/25/2024 53  36 - 65 % Final    Lymphocytes % 09/25/2024 35  24 - 43 % Final    Monocytes % 09/25/2024 9  3 - 12 % Final    Eosinophils % 09/25/2024 2  1 - 4 % Final    Basophils % 09/25/2024 1  0 - 2 % Final    Immature Granulocytes % 09/25/2024 0  0 % Final    Neutrophils Absolute 09/25/2024 2.22  1.50 - 8.10 k/uL Final    Lymphocytes Absolute 09/25/2024 1.46  1.10 - 3.70 k/uL Final    Monocytes Absolute 09/25/2024 0.37  0.10 - 1.20 k/uL Final    Eosinophils Absolute 09/25/2024 0.10  0.00 - 0.44 k/uL Final    Basophils Absolute 09/25/2024 <0.03  0.00 - 0.20 k/uL Final    Immature Granulocytes Absolute 09/25/2024 <0.03  0.00 - 0.30 k/uL Final    Sodium 09/25/2024 139  135 - 144 mmol/L Final    Potassium 09/25/2024 3.8  3.7 - 5.3 mmol/L Final    Chloride 09/25/2024 104  98 - 107 mmol/L Final    CO2 09/25/2024 27  20 - 31 mmol/L Final    Anion Gap 09/25/2024 8 (L)  9 - 17 mmol/L Final    Glucose 09/25/2024 93  70 - 99 mg/dL Final    BUN 09/25/2024 15  6 - 20 mg/dL Final    Creatinine 09/25/2024 1.0  0.7 - 1.2 mg/dL Final    Est, Glom Filt Rate 09/25/2024 >90  >60 mL/min/1.73m2 Final    Comment:       These results are not intended for use in patients <18 years of age.        eGFR results are calculated without a race factor using the 2021 CKD-EPI equation.  Careful clinical correlation is

## 2024-11-13 ENCOUNTER — OFFICE VISIT (OUTPATIENT)
Dept: CARDIOLOGY | Age: 44
End: 2024-11-13
Payer: COMMERCIAL

## 2024-11-13 VITALS
WEIGHT: 225 LBS | HEIGHT: 74 IN | OXYGEN SATURATION: 97 % | DIASTOLIC BLOOD PRESSURE: 80 MMHG | BODY MASS INDEX: 28.88 KG/M2 | SYSTOLIC BLOOD PRESSURE: 130 MMHG | HEART RATE: 62 BPM

## 2024-11-13 DIAGNOSIS — E78.5 HYPERLIPIDEMIA, UNSPECIFIED HYPERLIPIDEMIA TYPE: ICD-10-CM

## 2024-11-13 DIAGNOSIS — R55 VASODEPRESSOR SYNCOPE: Primary | ICD-10-CM

## 2024-11-13 PROCEDURE — 93000 ELECTROCARDIOGRAM COMPLETE: CPT | Performed by: INTERNAL MEDICINE

## 2024-11-13 PROCEDURE — 99214 OFFICE O/P EST MOD 30 MIN: CPT | Performed by: INTERNAL MEDICINE

## 2024-11-13 NOTE — PROGRESS NOTES
Today's Date: 11/13/2024  Patient's Name: Henri Moore  Patient's age: 43 y.o., 1980    Subjective:  Henri Moore is being seen in clinic today regarding Vasodepressor syndrome and hyperlipidemia    he is doing well from a cardiac standpoint. Good functional capacity. No chest pain, no dyspnea, no PND, no syncope or pre-syncope, no orthopnea.        Past Medical History:   has a past medical history of Erectile dysfunction, Hyperlipidemia, Mild mitral valve prolapse, Pectus excavatum, Prediabetes, and Vasodepressor syncope.    Past Surgical History:   has no past surgical history on file.    Home Medications:  Prior to Admission medications    Medication Sig Start Date End Date Taking? Authorizing Provider   citalopram (CELEXA) 10 MG tablet Take 1 tablet by mouth once daily 1/23/24   Royce Blunt MD   atorvastatin (LIPITOR) 10 MG tablet Take 1 tablet by mouth once daily 1/23/24   Royce Blunt MD       Allergies:  Patient has no known allergies.    Social History:   reports that he has never smoked. He has never used smokeless tobacco. He reports current alcohol use. He reports that he does not use drugs.     Family History: family history includes Cancer in his father and maternal grandmother; Chronic Infections in his maternal cousin; Colon Cancer in his maternal grandfather and paternal grandmother; Heart Attack in his father and maternal grandfather; Heart Disease in his father, paternal grandfather, and another family member; Hypertension in an other family member. No h/o sudden cardiac death.No for premature CAD    REVIEW OF SYSTEMS:    Constitutional: there has been no unanticipated weight loss. There's been No change in energy level, No change in activity level.     Eyes: No visual changes or diplopia. No scleral icterus.  ENT: No Headaches, hearing loss or vertigo. No mouth sores or sore throat.  Cardiovascular: see above  Respiratory: see above  Gastrointestinal: No abdominal

## 2025-01-31 NOTE — TELEPHONE ENCOUNTER
Henri called requesting a refill of the below medication which has been pended for you:     Requested Prescriptions     Pending Prescriptions Disp Refills    citalopram (CELEXA) 10 MG tablet [Pharmacy Med Name: Citalopram Hydrobromide 10 MG Oral Tablet] 90 tablet 0     Sig: Take 1 tablet by mouth once daily    atorvastatin (LIPITOR) 10 MG tablet [Pharmacy Med Name: Atorvastatin Calcium 10 MG Oral Tablet] 90 tablet 0     Sig: Take 1 tablet by mouth once daily       Last Appointment Date: 10/1/2024  Next Appointment Date: 4/1/2025    No Known Allergies

## 2025-02-03 RX ORDER — CITALOPRAM HYDROBROMIDE 10 MG/1
TABLET ORAL
Qty: 90 TABLET | Refills: 0 | Status: SHIPPED | OUTPATIENT
Start: 2025-02-03

## 2025-02-03 RX ORDER — ATORVASTATIN CALCIUM 10 MG/1
TABLET, FILM COATED ORAL
Qty: 90 TABLET | Refills: 0 | Status: SHIPPED | OUTPATIENT
Start: 2025-02-03

## 2025-03-21 ENCOUNTER — HOSPITAL ENCOUNTER (OUTPATIENT)
Age: 45
Discharge: HOME | End: 2025-03-21
Payer: COMMERCIAL

## 2025-03-21 ENCOUNTER — RESULTS FOLLOW-UP (OUTPATIENT)
Dept: FAMILY MEDICINE CLINIC | Age: 45
End: 2025-03-21

## 2025-03-21 DIAGNOSIS — E78.5 HYPERLIPIDEMIA, UNSPECIFIED HYPERLIPIDEMIA TYPE: ICD-10-CM

## 2025-03-21 DIAGNOSIS — R73.01 IMPAIRED FASTING BLOOD SUGAR: ICD-10-CM

## 2025-03-21 LAB
ALBUMIN SERPL-MCNC: 4.8 G/DL (ref 3.5–5.2)
ALBUMIN/GLOB SERPL: 2 {RATIO} (ref 1–2.5)
ALP SERPL-CCNC: 61 U/L (ref 40–129)
ALT SERPL-CCNC: 32 U/L (ref 5–41)
ANION GAP SERPL CALCULATED.3IONS-SCNC: 10 MMOL/L (ref 9–17)
AST SERPL-CCNC: 28 U/L
BASOPHILS # BLD: <0.03 K/UL (ref 0–0.2)
BASOPHILS NFR BLD: 0 % (ref 0–2)
BILIRUB SERPL-MCNC: 0.6 MG/DL (ref 0.3–1.2)
BUN SERPL-MCNC: 15 MG/DL (ref 6–20)
BUN/CREAT SERPL: 17 (ref 9–20)
CALCIUM SERPL-MCNC: 9.9 MG/DL (ref 8.6–10.4)
CHLORIDE SERPL-SCNC: 103 MMOL/L (ref 98–107)
CHOLEST SERPL-MCNC: 166 MG/DL (ref 0–199)
CHOLESTEROL/HDL RATIO: 5.2
CO2 SERPL-SCNC: 26 MMOL/L (ref 20–31)
CREAT SERPL-MCNC: 0.9 MG/DL (ref 0.7–1.2)
EOSINOPHIL # BLD: 0.1 K/UL (ref 0–0.44)
EOSINOPHILS RELATIVE PERCENT: 2 % (ref 1–4)
ERYTHROCYTE [DISTWIDTH] IN BLOOD BY AUTOMATED COUNT: 12.5 % (ref 11.8–14.4)
EST. AVERAGE GLUCOSE BLD GHB EST-MCNC: 97 MG/DL
GFR, ESTIMATED: >90 ML/MIN/1.73M2
GLUCOSE SERPL-MCNC: 96 MG/DL (ref 70–99)
HBA1C MFR BLD: 5 % (ref 4–6)
HCT VFR BLD AUTO: 43.6 % (ref 40.7–50.3)
HDLC SERPL-MCNC: 32 MG/DL
HGB BLD-MCNC: 14.7 G/DL (ref 13–17)
IMM GRANULOCYTES # BLD AUTO: <0.03 K/UL (ref 0–0.3)
IMM GRANULOCYTES NFR BLD: 0 %
LDLC SERPL CALC-MCNC: 104 MG/DL (ref 0–100)
LYMPHOCYTES NFR BLD: 1.87 K/UL (ref 1.1–3.7)
LYMPHOCYTES RELATIVE PERCENT: 35 % (ref 24–43)
MCH RBC QN AUTO: 31.1 PG (ref 25.2–33.5)
MCHC RBC AUTO-ENTMCNC: 33.7 G/DL (ref 25.2–33.5)
MCV RBC AUTO: 92.4 FL (ref 82.6–102.9)
MONOCYTES NFR BLD: 0.34 K/UL (ref 0.1–1.2)
MONOCYTES NFR BLD: 6 % (ref 3–12)
NEUTROPHILS NFR BLD: 57 % (ref 36–65)
NEUTS SEG NFR BLD: 3.04 K/UL (ref 1.5–8.1)
NRBC BLD-RTO: 0 PER 100 WBC
PLATELET # BLD AUTO: 193 K/UL (ref 138–453)
PMV BLD AUTO: 9.8 FL (ref 8.1–13.5)
POTASSIUM SERPL-SCNC: 3.9 MMOL/L (ref 3.7–5.3)
PROT SERPL-MCNC: 7.2 G/DL (ref 6.4–8.3)
RBC # BLD AUTO: 4.72 M/UL (ref 4.21–5.77)
SODIUM SERPL-SCNC: 139 MMOL/L (ref 135–144)
TRIGL SERPL-MCNC: 152 MG/DL
VLDLC SERPL CALC-MCNC: 30 MG/DL (ref 1–30)
WBC OTHER # BLD: 5.4 K/UL (ref 3.5–11.3)

## 2025-03-21 PROCEDURE — 80061 LIPID PANEL: CPT

## 2025-03-21 PROCEDURE — 36415 COLL VENOUS BLD VENIPUNCTURE: CPT

## 2025-03-21 PROCEDURE — 83036 HEMOGLOBIN GLYCOSYLATED A1C: CPT

## 2025-03-21 PROCEDURE — 85025 COMPLETE CBC W/AUTO DIFF WBC: CPT

## 2025-03-21 PROCEDURE — 80053 COMPREHEN METABOLIC PANEL: CPT

## 2025-04-01 ENCOUNTER — OFFICE VISIT (OUTPATIENT)
Dept: FAMILY MEDICINE CLINIC | Age: 45
End: 2025-04-01
Payer: COMMERCIAL

## 2025-04-01 VITALS
DIASTOLIC BLOOD PRESSURE: 72 MMHG | OXYGEN SATURATION: 97 % | HEART RATE: 64 BPM | WEIGHT: 233.2 LBS | BODY MASS INDEX: 29.93 KG/M2 | SYSTOLIC BLOOD PRESSURE: 122 MMHG | HEIGHT: 74 IN | RESPIRATION RATE: 16 BRPM

## 2025-04-01 DIAGNOSIS — E78.5 HYPERLIPIDEMIA, UNSPECIFIED HYPERLIPIDEMIA TYPE: Primary | ICD-10-CM

## 2025-04-01 DIAGNOSIS — M72.2 PLANTAR FASCIITIS, BILATERAL: ICD-10-CM

## 2025-04-01 DIAGNOSIS — L98.9 NON-HEALING SKIN LESION OF NOSE: ICD-10-CM

## 2025-04-01 DIAGNOSIS — R55 VASODEPRESSOR SYNCOPE: ICD-10-CM

## 2025-04-01 DIAGNOSIS — R73.01 IMPAIRED FASTING BLOOD SUGAR: ICD-10-CM

## 2025-04-01 PROCEDURE — 99214 OFFICE O/P EST MOD 30 MIN: CPT | Performed by: FAMILY MEDICINE

## 2025-04-01 SDOH — ECONOMIC STABILITY: FOOD INSECURITY: WITHIN THE PAST 12 MONTHS, YOU WORRIED THAT YOUR FOOD WOULD RUN OUT BEFORE YOU GOT MONEY TO BUY MORE.: NEVER TRUE

## 2025-04-01 SDOH — ECONOMIC STABILITY: FOOD INSECURITY: WITHIN THE PAST 12 MONTHS, THE FOOD YOU BOUGHT JUST DIDN'T LAST AND YOU DIDN'T HAVE MONEY TO GET MORE.: NEVER TRUE

## 2025-04-01 ASSESSMENT — PATIENT HEALTH QUESTIONNAIRE - PHQ9
SUM OF ALL RESPONSES TO PHQ QUESTIONS 1-9: 0
2. FEELING DOWN, DEPRESSED OR HOPELESS: NOT AT ALL
1. LITTLE INTEREST OR PLEASURE IN DOING THINGS: NOT AT ALL

## 2025-04-01 NOTE — PROGRESS NOTES
Subjective:      Patient ID: Henri Moore is a 44 y.o. male.    Hyperlipidemia    Foot Pain     Back Pain       Routine  follow up on chronic medical conditions, refills, and review of updated labs.  I have reviewed the patient's medical history in detail and updated the computerized patient record.   Feeling well at present. No interval or illness to report. 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  Heel pain still off and on but much better then in the past, manageable.  Discussed heel cups, especially with work boots.   He does restrict some activities like running that aggravate the issue.  Overall improved.  Mild/nagging by report.  Intermittent pain at present , left >right.  Notices changes with different shos.    Tolerating statin. Compliant with meds without concerns for side effects.     Single episode of dizziness after standing up.  No syncope.     Some right elbow pain on the last 2 weeks.  No recalled initiating event.  Olecranon process , no effusion or active bursitis findings.   Past Medical History:   Diagnosis Date    Erectile dysfunction     Hyperlipidemia     Mild mitral valve prolapse     with mild regurgitation, asymptomatic.     Pectus excavatum     Prediabetes     Vasodepressor syncope     since 1999.     History reviewed. No pertinent surgical history.  Current Outpatient Medications   Medication Sig Dispense Refill    citalopram (CELEXA) 10 MG tablet Take 1 tablet by mouth once daily 90 tablet 0    atorvastatin (LIPITOR) 10 MG tablet Take 1 tablet by mouth once daily 90 tablet 0     No current facility-administered medications for this visit.     No Known Allergies  Social History     Tobacco Use    Smoking status: Never    Smokeless tobacco: Never   Substance Use Topics    Alcohol use: Yes     Alcohol/week: 0.0 standard drinks of alcohol     Comment: occasional, less than 1 drink per day    Drug use: No

## 2025-04-01 NOTE — PATIENT INSTRUCTIONS
Hospital Outpatient Visit on 03/21/2025   Component Date Value Ref Range Status    Hemoglobin A1C 03/21/2025 5.0  4.0 - 6.0 % Final    Estimated Avg Glucose 03/21/2025 97  mg/dL Final    Comment: The ADA and AACC recommend providing the estimated average glucose result to permit better   patient understanding of their HBA1c result.      WBC 03/21/2025 5.4  3.5 - 11.3 k/uL Final    RBC 03/21/2025 4.72  4.21 - 5.77 m/uL Final    Hemoglobin 03/21/2025 14.7  13.0 - 17.0 g/dL Final    Hematocrit 03/21/2025 43.6  40.7 - 50.3 % Final    MCV 03/21/2025 92.4  82.6 - 102.9 fL Final    MCH 03/21/2025 31.1  25.2 - 33.5 pg Final    MCHC 03/21/2025 33.7 (H)  25.2 - 33.5 g/dL Final    RDW 03/21/2025 12.5  11.8 - 14.4 % Final    Platelets 03/21/2025 193  138 - 453 k/uL Final    MPV 03/21/2025 9.8  8.1 - 13.5 fL Final    NRBC Automated 03/21/2025 0.0  0.0 per 100 WBC Final    Neutrophils % 03/21/2025 57  36 - 65 % Final    Lymphocytes % 03/21/2025 35  24 - 43 % Final    Monocytes % 03/21/2025 6  3 - 12 % Final    Eosinophils % 03/21/2025 2  1 - 4 % Final    Basophils % 03/21/2025 0  0 - 2 % Final    Immature Granulocytes % 03/21/2025 0  0 % Final    Neutrophils Absolute 03/21/2025 3.04  1.50 - 8.10 k/uL Final    Lymphocytes Absolute 03/21/2025 1.87  1.10 - 3.70 k/uL Final    Monocytes Absolute 03/21/2025 0.34  0.10 - 1.20 k/uL Final    Eosinophils Absolute 03/21/2025 0.10  0.00 - 0.44 k/uL Final    Basophils Absolute 03/21/2025 <0.03  0.00 - 0.20 k/uL Final    Immature Granulocytes Absolute 03/21/2025 <0.03  0.00 - 0.30 k/uL Final    Sodium 03/21/2025 139  135 - 144 mmol/L Final    Potassium 03/21/2025 3.9  3.7 - 5.3 mmol/L Final    Chloride 03/21/2025 103  98 - 107 mmol/L Final    CO2 03/21/2025 26  20 - 31 mmol/L Final    Anion Gap 03/21/2025 10  9 - 17 mmol/L Final    Glucose 03/21/2025 96  70 - 99 mg/dL Final    BUN 03/21/2025 15  6 - 20 mg/dL Final    Creatinine 03/21/2025 0.9  0.7 - 1.2 mg/dL Final    Est, Glom Filt Rate

## 2025-07-14 RX ORDER — CITALOPRAM HYDROBROMIDE 10 MG/1
10 TABLET ORAL DAILY
Qty: 90 TABLET | Refills: 0 | Status: SHIPPED | OUTPATIENT
Start: 2025-07-14

## 2025-07-14 RX ORDER — ATORVASTATIN CALCIUM 10 MG/1
10 TABLET, FILM COATED ORAL DAILY
Qty: 90 TABLET | Refills: 0 | Status: SHIPPED | OUTPATIENT
Start: 2025-07-14